# Patient Record
Sex: MALE | Race: WHITE | NOT HISPANIC OR LATINO | Employment: FULL TIME | URBAN - METROPOLITAN AREA
[De-identification: names, ages, dates, MRNs, and addresses within clinical notes are randomized per-mention and may not be internally consistent; named-entity substitution may affect disease eponyms.]

---

## 2020-11-02 ENCOUNTER — HOSPITAL ENCOUNTER (INPATIENT)
Facility: HOSPITAL | Age: 30
LOS: 1 days | Discharge: HOME | DRG: 177 | End: 2020-11-03
Attending: EMERGENCY MEDICINE | Admitting: FAMILY MEDICINE
Payer: COMMERCIAL

## 2020-11-02 DIAGNOSIS — R55 SYNCOPE: ICD-10-CM

## 2020-11-02 DIAGNOSIS — U07.1 COVID-19: Primary | ICD-10-CM

## 2020-11-02 DIAGNOSIS — R05.9 COUGH: ICD-10-CM

## 2020-11-02 LAB
ALBUMIN SERPL BCP-MCNC: 3.9 G/DL (ref 3.5–5.2)
ALP SERPL-CCNC: 87 U/L (ref 55–135)
ALT SERPL W/O P-5'-P-CCNC: 25 U/L (ref 10–44)
ANION GAP SERPL CALC-SCNC: 9 MMOL/L (ref 8–16)
AST SERPL-CCNC: 28 U/L (ref 10–40)
BASOPHILS # BLD AUTO: 0.01 K/UL (ref 0–0.2)
BASOPHILS NFR BLD: 0.2 % (ref 0–1.9)
BILIRUB SERPL-MCNC: 0.4 MG/DL (ref 0.1–1)
BUN SERPL-MCNC: 9 MG/DL (ref 6–20)
CALCIUM SERPL-MCNC: 8 MG/DL (ref 8.7–10.5)
CHLORIDE SERPL-SCNC: 103 MMOL/L (ref 95–110)
CK SERPL-CCNC: 226 U/L (ref 20–200)
CO2 SERPL-SCNC: 25 MMOL/L (ref 23–29)
CREAT SERPL-MCNC: 0.9 MG/DL (ref 0.5–1.4)
CRP SERPL-MCNC: 9.3 MG/L (ref 0–8.2)
CTP QC/QA: YES
DIFFERENTIAL METHOD: ABNORMAL
EOSINOPHIL # BLD AUTO: 0 K/UL (ref 0–0.5)
EOSINOPHIL NFR BLD: 0.4 % (ref 0–8)
ERYTHROCYTE [DISTWIDTH] IN BLOOD BY AUTOMATED COUNT: 13.2 % (ref 11.5–14.5)
EST. GFR  (AFRICAN AMERICAN): >60 ML/MIN/1.73 M^2
EST. GFR  (NON AFRICAN AMERICAN): >60 ML/MIN/1.73 M^2
FERRITIN SERPL-MCNC: 262 NG/ML (ref 20–300)
GLUCOSE SERPL-MCNC: 99 MG/DL (ref 70–110)
HCT VFR BLD AUTO: 40.3 % (ref 40–54)
HGB BLD-MCNC: 14.4 G/DL (ref 14–18)
IMM GRANULOCYTES # BLD AUTO: 0.01 K/UL (ref 0–0.04)
IMM GRANULOCYTES NFR BLD AUTO: 0.2 % (ref 0–0.5)
LACTATE SERPL-SCNC: 0.8 MMOL/L (ref 0.5–2.2)
LDH SERPL L TO P-CCNC: 216 U/L (ref 110–260)
LYMPHOCYTES # BLD AUTO: 1.1 K/UL (ref 1–4.8)
LYMPHOCYTES NFR BLD: 21.1 % (ref 18–48)
MCH RBC QN AUTO: 30.7 PG (ref 27–31)
MCHC RBC AUTO-ENTMCNC: 35.7 G/DL (ref 32–36)
MCV RBC AUTO: 86 FL (ref 82–98)
MONOCYTES # BLD AUTO: 0.6 K/UL (ref 0.3–1)
MONOCYTES NFR BLD: 12 % (ref 4–15)
NEUTROPHILS # BLD AUTO: 3.4 K/UL (ref 1.8–7.7)
NEUTROPHILS NFR BLD: 66.1 % (ref 38–73)
NRBC BLD-RTO: 0 /100 WBC
PLATELET # BLD AUTO: 125 K/UL (ref 150–350)
PMV BLD AUTO: 12.4 FL (ref 9.2–12.9)
POTASSIUM SERPL-SCNC: 3.8 MMOL/L (ref 3.5–5.1)
PROT SERPL-MCNC: 7 G/DL (ref 6–8.4)
RBC # BLD AUTO: 4.69 M/UL (ref 4.6–6.2)
SARS-COV-2 RDRP RESP QL NAA+PROBE: POSITIVE
SODIUM SERPL-SCNC: 137 MMOL/L (ref 136–145)
TROPONIN I SERPL DL<=0.01 NG/ML-MCNC: <0.006 NG/ML (ref 0–0.03)
WBC # BLD AUTO: 5.17 K/UL (ref 3.9–12.7)

## 2020-11-02 PROCEDURE — 94761 N-INVAS EAR/PLS OXIMETRY MLT: CPT

## 2020-11-02 PROCEDURE — 96367 TX/PROPH/DG ADDL SEQ IV INF: CPT

## 2020-11-02 PROCEDURE — U0002 COVID-19 LAB TEST NON-CDC: HCPCS | Performed by: EMERGENCY MEDICINE

## 2020-11-02 PROCEDURE — 25000242 PHARM REV CODE 250 ALT 637 W/ HCPCS: Performed by: FAMILY MEDICINE

## 2020-11-02 PROCEDURE — 93010 EKG 12-LEAD: ICD-10-PCS | Mod: ,,, | Performed by: INTERNAL MEDICINE

## 2020-11-02 PROCEDURE — 85025 COMPLETE CBC W/AUTO DIFF WBC: CPT

## 2020-11-02 PROCEDURE — 82728 ASSAY OF FERRITIN: CPT

## 2020-11-02 PROCEDURE — 63600175 PHARM REV CODE 636 W HCPCS: Performed by: EMERGENCY MEDICINE

## 2020-11-02 PROCEDURE — 93005 ELECTROCARDIOGRAM TRACING: CPT

## 2020-11-02 PROCEDURE — 96365 THER/PROPH/DIAG IV INF INIT: CPT

## 2020-11-02 PROCEDURE — 86140 C-REACTIVE PROTEIN: CPT

## 2020-11-02 PROCEDURE — 99900035 HC TECH TIME PER 15 MIN (STAT)

## 2020-11-02 PROCEDURE — 99285 EMERGENCY DEPT VISIT HI MDM: CPT | Mod: 25

## 2020-11-02 PROCEDURE — 63600175 PHARM REV CODE 636 W HCPCS: Performed by: FAMILY MEDICINE

## 2020-11-02 PROCEDURE — 94664 DEMO&/EVAL PT USE INHALER: CPT

## 2020-11-02 PROCEDURE — 82550 ASSAY OF CK (CPK): CPT

## 2020-11-02 PROCEDURE — 87040 BLOOD CULTURE FOR BACTERIA: CPT

## 2020-11-02 PROCEDURE — 84484 ASSAY OF TROPONIN QUANT: CPT

## 2020-11-02 PROCEDURE — 93010 ELECTROCARDIOGRAM REPORT: CPT | Mod: ,,, | Performed by: INTERNAL MEDICINE

## 2020-11-02 PROCEDURE — 80053 COMPREHEN METABOLIC PANEL: CPT

## 2020-11-02 PROCEDURE — 83605 ASSAY OF LACTIC ACID: CPT

## 2020-11-02 PROCEDURE — 27000646 HC AEROBIKA DEVICE

## 2020-11-02 PROCEDURE — 94640 AIRWAY INHALATION TREATMENT: CPT

## 2020-11-02 PROCEDURE — 25000003 PHARM REV CODE 250: Performed by: FAMILY MEDICINE

## 2020-11-02 PROCEDURE — 83615 LACTATE (LD) (LDH) ENZYME: CPT

## 2020-11-02 PROCEDURE — 96375 TX/PRO/DX INJ NEW DRUG ADDON: CPT

## 2020-11-02 PROCEDURE — 20600001 HC STEP DOWN PRIVATE ROOM

## 2020-11-02 RX ORDER — IBUPROFEN 200 MG
24 TABLET ORAL
Status: DISCONTINUED | OUTPATIENT
Start: 2020-11-02 | End: 2020-11-03 | Stop reason: HOSPADM

## 2020-11-02 RX ORDER — ACETAMINOPHEN 500 MG
1000 TABLET ORAL EVERY 8 HOURS PRN
Status: DISCONTINUED | OUTPATIENT
Start: 2020-11-02 | End: 2020-11-03 | Stop reason: HOSPADM

## 2020-11-02 RX ORDER — TALC
6 POWDER (GRAM) TOPICAL NIGHTLY PRN
Status: DISCONTINUED | OUTPATIENT
Start: 2020-11-02 | End: 2020-11-03 | Stop reason: HOSPADM

## 2020-11-02 RX ORDER — GLUCAGON 1 MG
1 KIT INJECTION
Status: DISCONTINUED | OUTPATIENT
Start: 2020-11-02 | End: 2020-11-03 | Stop reason: HOSPADM

## 2020-11-02 RX ORDER — DEXAMETHASONE SODIUM PHOSPHATE 4 MG/ML
6 INJECTION, SOLUTION INTRA-ARTICULAR; INTRALESIONAL; INTRAMUSCULAR; INTRAVENOUS; SOFT TISSUE
Status: COMPLETED | OUTPATIENT
Start: 2020-11-02 | End: 2020-11-02

## 2020-11-02 RX ORDER — IBUPROFEN 200 MG
16 TABLET ORAL
Status: DISCONTINUED | OUTPATIENT
Start: 2020-11-02 | End: 2020-11-03 | Stop reason: HOSPADM

## 2020-11-02 RX ORDER — INSULIN ASPART 100 [IU]/ML
0-5 INJECTION, SOLUTION INTRAVENOUS; SUBCUTANEOUS
Status: DISCONTINUED | OUTPATIENT
Start: 2020-11-02 | End: 2020-11-03 | Stop reason: HOSPADM

## 2020-11-02 RX ORDER — SODIUM CHLORIDE 0.9 % (FLUSH) 0.9 %
10 SYRINGE (ML) INJECTION
Status: DISCONTINUED | OUTPATIENT
Start: 2020-11-02 | End: 2020-11-03 | Stop reason: HOSPADM

## 2020-11-02 RX ORDER — ENOXAPARIN SODIUM 100 MG/ML
40 INJECTION SUBCUTANEOUS EVERY 24 HOURS
Status: DISCONTINUED | OUTPATIENT
Start: 2020-11-02 | End: 2020-11-03 | Stop reason: HOSPADM

## 2020-11-02 RX ORDER — ONDANSETRON 2 MG/ML
4 INJECTION INTRAMUSCULAR; INTRAVENOUS EVERY 8 HOURS PRN
Status: DISCONTINUED | OUTPATIENT
Start: 2020-11-02 | End: 2020-11-03 | Stop reason: HOSPADM

## 2020-11-02 RX ORDER — BENZONATATE 100 MG/1
100 CAPSULE ORAL 3 TIMES DAILY PRN
Status: DISCONTINUED | OUTPATIENT
Start: 2020-11-02 | End: 2020-11-03 | Stop reason: HOSPADM

## 2020-11-02 RX ORDER — AZITHROMYCIN 250 MG/1
500 TABLET, FILM COATED ORAL
Status: DISCONTINUED | OUTPATIENT
Start: 2020-11-02 | End: 2020-11-03 | Stop reason: HOSPADM

## 2020-11-02 RX ORDER — ALBUTEROL SULFATE 90 UG/1
2 AEROSOL, METERED RESPIRATORY (INHALATION) EVERY 6 HOURS
Status: DISCONTINUED | OUTPATIENT
Start: 2020-11-02 | End: 2020-11-03 | Stop reason: HOSPADM

## 2020-11-02 RX ORDER — CHOLECALCIFEROL (VITAMIN D3) 25 MCG
1000 TABLET ORAL DAILY
Status: DISCONTINUED | OUTPATIENT
Start: 2020-11-03 | End: 2020-11-03 | Stop reason: HOSPADM

## 2020-11-02 RX ORDER — ASCORBIC ACID 500 MG
500 TABLET ORAL 2 TIMES DAILY
Status: DISCONTINUED | OUTPATIENT
Start: 2020-11-02 | End: 2020-11-03 | Stop reason: HOSPADM

## 2020-11-02 RX ORDER — CEFTRIAXONE 1 G/1
1 INJECTION, POWDER, FOR SOLUTION INTRAMUSCULAR; INTRAVENOUS ONCE
Status: DISCONTINUED | OUTPATIENT
Start: 2020-11-02 | End: 2020-11-03 | Stop reason: HOSPADM

## 2020-11-02 RX ADMIN — ENOXAPARIN SODIUM 40 MG: 40 INJECTION SUBCUTANEOUS at 08:11

## 2020-11-02 RX ADMIN — DEXAMETHASONE SODIUM PHOSPHATE 6 MG: 4 INJECTION, SOLUTION INTRA-ARTICULAR; INTRALESIONAL; INTRAMUSCULAR; INTRAVENOUS; SOFT TISSUE at 12:11

## 2020-11-02 RX ADMIN — CEFTRIAXONE 2 G: 2 INJECTION, SOLUTION INTRAVENOUS at 12:11

## 2020-11-02 RX ADMIN — OXYCODONE HYDROCHLORIDE AND ACETAMINOPHEN 500 MG: 500 TABLET ORAL at 08:11

## 2020-11-02 RX ADMIN — AZITHROMYCIN 500 MG: 500 INJECTION, POWDER, LYOPHILIZED, FOR SOLUTION INTRAVENOUS at 01:11

## 2020-11-02 RX ADMIN — ALBUTEROL SULFATE 2 PUFF: 90 AEROSOL, METERED RESPIRATORY (INHALATION) at 09:11

## 2020-11-02 NOTE — ED NOTES
Patient to ed with c/o generalized aches, fever and chills. Pt sent from Urgent care after pt had syncopal episode in urgent care. Pt reports with red and swollen lip after pt fell, striking his face. Reports LOC for unknown amount of time. Patient now AAOx3. Neuro intact. Follows commands. Speech is clear. Moves bilat upper and lower ext equally. Patient ambulates without difficulty. Cardiac mon in place. Vs stable. Will continue to monitor.

## 2020-11-02 NOTE — PLAN OF CARE
(Physician in Lead of Transfers)   Outside Transfer Acceptance Note / Regional Referral Center    Name: Bob Connell    Transferring Physician: Arnol Fortune MD///Emergency Medicine    Accepting Physician: STEVEN Mesa MD    Date of Acceptance:  November 2, 2020    Transferring Facility:  [facility] Emergency Department    Destination Facility and Admitting Physician:  [facility] medicine COVID med/tele    Reason for Transfer:  COVID pneumonia    Report from Transferring Physician/Hospital course: 30-year-old male with no significant past medical history presented to the [facility] emergency department with an episode of loss of consciousness while checking out from an urgent care center.  By report he had negative strep and influenza testing.  He was at the urgent care for evaluation of fever, body aches, congestion, and fatigue.  COVID in the emergency department was positive.  Chest x-ray had new diffuse bilateral airspace opacities.  With ambulation, oxygen saturations were 92%.  He had no focal neurologic deficits and was awake and alert with normal speech.  In the emergency department he received Rocephin, azithromycin, and dexamethasone.  The ER physician felt he was stable for transfer.    VS:  Temperature 98.4°, pulse 82, respirations 20, blood pressure 119/70, oxygen saturation 96% on 3 L nasal cannula    Labs: see epic, COVID positive    Radiographs: see epic    To Do List: Admit to  COVID med tele  COVID protocols    Upon patient arrival to floor, please send SecureChat to [facility] P or call extension [phone] (if no answer, this will flip to a beeper, so enter your call back number) for Hospital Medicine admit team assignment and for additional admit orders for the patient.  Do not page the attending physician associated with the patient on arrival (this physician may not be on duty at the time of arrival).  Rather, always call [phone] to reach the triage physician for orders and  team assignment.      STEVEN Mesa MD  [facility]  Cell: [phone]

## 2020-11-02 NOTE — ED NOTES
Ambulatory room 02 sat ranged from 92%-96%. Pt denied SOB while ambulating. Ambulated without difficulty. Appears to be in NAD. Patient back in room with cardiac mon in place. Waiting results. Wife at bedside. Will continue to monitor.

## 2020-11-02 NOTE — ED NOTES
Report given to MILA Drummond at [facility]. Waiting for patient transfer.

## 2020-11-02 NOTE — ED PROVIDER NOTES
Encounter Date: 11/2/2020    SCRIBE #1 NOTE: I, Leif Joaquin, am scribing for, and in the presence of,  Arnol Fortune MD. I have scribed the following portions of the note - Other sections scribed: HPI, ROS, PE.       History     Chief Complaint   Patient presents with    Loss of Consciousness     pt had syncopal episode at urgent care.  pt did hit head on counter.  pt being seen for flu like illness at urgent care.  pt found bradycardic @48bpm.  300ml ns nsr.       This 30 y.o M with no pertinent PMHx presents to the ED via EMS c/o LOC PTA. The pt reports a syncopal episode while checking out at urgent care. He reports hitting the right side of his head on the desk. Per EMS, he did have a negative strep test and influenza test. He has not yet received the results of his COVID test. He was seen at  c/o fever, generalized body aches, nasal congestion and fatigue. He has not eaten today. He consumed 2 bottles of water yesterday. EMS reports a CBG of 105 mg/dL and HR of 48 bpm. No recent travel. No known sick contacts. He denies numbness, weakness, dysuria, difficulty urinating, constipation, diarrhea, emesis, diarrhea, SOB, rash and any other associated symptoms. EMS administered 300ml NS PTA. He does not smoke cigarettes, consume EtOH or use illicit drugs.       The history is provided by the patient.     Review of patient's allergies indicates:  No Known Allergies  History reviewed. No pertinent past medical history.  Past Surgical History:   Procedure Laterality Date    SHOULDER SURGERY       Family History   Problem Relation Age of Onset    Diabetes Mother     Diabetes Father     Cancer Maternal Grandmother     Stroke Neg Hx     Heart disease Neg Hx     Hyperlipidemia Neg Hx     Hypertension Neg Hx      Social History     Tobacco Use    Smoking status: Never Smoker    Smokeless tobacco: Never Used   Substance Use Topics    Alcohol use: Not Currently    Drug use: No     Review of Systems    Constitutional: Positive for fatigue and fever. Negative for chills.   HENT: Positive for congestion. Negative for rhinorrhea and sore throat.    Eyes: Negative for redness.   Respiratory: Negative for cough and shortness of breath.    Cardiovascular: Negative for chest pain.   Gastrointestinal: Negative for abdominal pain, diarrhea, nausea and vomiting.   Genitourinary: Negative for difficulty urinating and dysuria.   Musculoskeletal: Positive for myalgias. Negative for back pain.   Skin: Negative for color change and rash.   Neurological: Positive for syncope. Negative for weakness.   Psychiatric/Behavioral: The patient is not nervous/anxious.        Physical Exam     Initial Vitals [11/02/20 1039]   BP Pulse Resp Temp SpO2   (!) 128/91 80 20 99.8 °F (37.7 °C) 96 %      MAP       --         Physical Exam    Nursing note and vitals reviewed.  Constitutional: He appears well-developed and well-nourished.   HENT:   Head: Normocephalic. Head is without raccoon's eyes and without Chen's sign.   Right Ear: No hemotympanum.   Left Ear: No hemotympanum.   Nose: No nasal septal hematoma.   Mouth/Throat: Mucous membranes are normal.   Patent. Tongue is dry.   Eyes: Conjunctivae and EOM are normal. Pupils are equal, round, and reactive to light. Right conjunctiva is not injected. Left conjunctiva is not injected.   sclera anicteric   Neck: Full passive range of motion without pain. Neck supple.   Cardiovascular: Regular rhythm, S1 normal, S2 normal and normal heart sounds. Exam reveals no gallop and no friction rub.    No murmur heard.  Pulses:       Radial pulses are 2+ on the right side and 2+ on the left side.   Pulmonary/Chest: Effort normal and breath sounds normal. No respiratory distress.   Abdominal: Soft. Normal appearance. He exhibits no distension. There is no abdominal tenderness.   Musculoskeletal:      Comments: good active ROM of all extremities, no lower extremity edema or cyanosis   Lymphadenopathy:         Head (right side): Tonsillar adenopathy present.        Head (left side): Tonsillar adenopathy present.   Neurological: He is alert. No cranial nerve deficit or sensory deficit. Gait normal.   A&Ox4, normal speech   Skin: Skin is warm. Capillary refill takes 2 to 3 seconds. No ecchymosis and no rash noted.   Psychiatric: He has a normal mood and affect. Thought content normal.         ED Course   Procedures  Labs Reviewed   CBC W/ AUTO DIFFERENTIAL - Abnormal; Notable for the following components:       Result Value    Platelets 125 (*)     All other components within normal limits   COMPREHENSIVE METABOLIC PANEL - Abnormal; Notable for the following components:    Calcium 8.0 (*)     All other components within normal limits   C-REACTIVE PROTEIN - Abnormal; Notable for the following components:    CRP 9.3 (*)     All other components within normal limits   CK - Abnormal; Notable for the following components:     (*)     All other components within normal limits   SARS-COV-2 RDRP GENE - Abnormal; Notable for the following components:    POC Rapid COVID Positive (*)     All other components within normal limits   CULTURE, BLOOD   CULTURE, BLOOD   FERRITIN   LACTATE DEHYDROGENASE   LACTIC ACID, PLASMA   TROPONIN I        ECG Results          EKG 12-lead (In process)  Result time 11/02/20 12:46:32    In process by Interface, [facility] (11/02/20 12:46:32)                 Narrative:    Test Reason : R55,    Vent. Rate : 082 BPM     Atrial Rate : 082 BPM     P-R Int : 138 ms          QRS Dur : 086 ms      QT Int : 350 ms       P-R-T Axes : 051 072 006 degrees     QTc Int : 408 ms    Normal sinus rhythm  Normal ECG  No previous ECGs available    Referred By: [email or web address]   SELF           Confirmed By:                   In process by Interface, [facility] (11/02/20 12:37:12)                 Narrative:    Test Reason : R55,    Vent. Rate : 082 BPM     Atrial Rate : 082 BPM     P-R Int : 138 ms          QRS Dur  : 086 ms      QT Int : 350 ms       P-R-T Axes : 051 072 006 degrees     QTc Int : 408 ms    Normal sinus rhythm  Normal ECG  No previous ECGs available    Referred By: AAAREFERR   SELF           Confirmed By:                             Imaging Results          X-Ray Chest 1 View (Final result)  Result time 11/02/20 11:10:55   Procedure changed from X-Ray Chest PA And Lateral     Final result by Flako Tabares MD (11/02/20 11:10:55)                 Impression:      New diffuse bilateral ill-defined airspace opacities, possibly edema or infectious/inflammatory disease.  Recommend follow-up to resolution, as clinically indicated.      Electronically signed by: Flako Tabares MD  Date:    11/02/2020  Time:    11:10             Narrative:    EXAMINATION:  XR CHEST 1 VIEW    CLINICAL HISTORY:  cough; Cough    TECHNIQUE:  Single frontal view of the chest was performed.    COMPARISON:  December 10, 2018    FINDINGS:  Diffuse bilateral ill-defined airspace opacities, new since 2018.    No effusion or pneumothorax.    No acute bone abnormality.                                 Medical Decision Making:   Initial Assessment:   30-year-old male presenting secondary to upper respiratory like infections in a syncopal event today.  No major medical problems other than overweight.  Patient was found to be COVID positive.  Ambulatory O2 sat dropped to 92%.  Fundi bilateral opacities.  Patient was given a L of fluids here in the emergency department also started on ceftriaxone azithromycin along with 6 mg Decadron.  Due to dropping below 94% following [facility] recommendations will transfer patient over to [facility] further workup and management.  Rest of his labs were added on which did not show any marked abnormalities.  Patient is agreeable at this time to transfer.  I spoke with Dr. Mesa who accepted transfer.  Independently Interpreted Test(s):   I have ordered and independently interpreted EKG Reading(s) - see prior  notes  Clinical Tests:   Lab Tests: Ordered and Reviewed  Radiological Study: Ordered and Reviewed  Medical Tests: Ordered and Reviewed            Scribe Attestation:   Scribe #1: I performed the above scribed service and the documentation accurately describes the services I performed. I attest to the accuracy of the note.                      Clinical Impression:       ICD-10-CM ICD-9-CM   1. COVID-19  U07.1 079.89   2. Syncope  R55 780.2   3. Cough  R05 786.2                          ED Disposition Condition    Transfer to Another Facility Stable             I, arnol fortune, personally performed the services described in this documentation. All medical record entries made by the scribe were at my direction and in my presence. I have reviewed the chart and agree that the record reflects my personal performance and is accurate and complete.                 Arnol Fortune MD  11/02/20 5162

## 2020-11-02 NOTE — ED NOTES
Patient and wife informed of admission status. Patient's wife has further questions regarding admission and transfer of care. Family member requesting to speak to MD. MD informed. To see patient. Will continue to monitor.

## 2020-11-03 ENCOUNTER — NURSE TRIAGE (OUTPATIENT)
Dept: ADMINISTRATIVE | Facility: CLINIC | Age: 30
End: 2020-11-03

## 2020-11-03 VITALS
TEMPERATURE: 98 F | BODY MASS INDEX: 38.66 KG/M2 | DIASTOLIC BLOOD PRESSURE: 88 MMHG | HEIGHT: 70 IN | SYSTOLIC BLOOD PRESSURE: 158 MMHG | HEART RATE: 87 BPM | WEIGHT: 270.06 LBS | RESPIRATION RATE: 20 BRPM | OXYGEN SATURATION: 97 %

## 2020-11-03 PROBLEM — R55 SYNCOPE: Status: ACTIVE | Noted: 2020-11-03

## 2020-11-03 LAB
ALBUMIN SERPL BCP-MCNC: 3.7 G/DL (ref 3.5–5.2)
ALP SERPL-CCNC: 84 U/L (ref 55–135)
ALT SERPL W/O P-5'-P-CCNC: 36 U/L (ref 10–44)
ANION GAP SERPL CALC-SCNC: 12 MMOL/L (ref 8–16)
ANISOCYTOSIS BLD QL SMEAR: SLIGHT
AST SERPL-CCNC: 31 U/L (ref 10–40)
BASOPHILS # BLD AUTO: 0 K/UL (ref 0–0.2)
BASOPHILS NFR BLD: 0 % (ref 0–1.9)
BILIRUB SERPL-MCNC: 0.4 MG/DL (ref 0.1–1)
BNP SERPL-MCNC: <10 PG/ML (ref 0–99)
BUN SERPL-MCNC: 12 MG/DL (ref 6–20)
CALCIUM SERPL-MCNC: 9 MG/DL (ref 8.7–10.5)
CHLORIDE SERPL-SCNC: 105 MMOL/L (ref 95–110)
CO2 SERPL-SCNC: 22 MMOL/L (ref 23–29)
CREAT SERPL-MCNC: 0.8 MG/DL (ref 0.5–1.4)
D DIMER PPP IA.FEU-MCNC: 0.34 MG/L FEU
DIFFERENTIAL METHOD: ABNORMAL
EOSINOPHIL # BLD AUTO: 0 K/UL (ref 0–0.5)
EOSINOPHIL NFR BLD: 0 % (ref 0–8)
ERYTHROCYTE [DISTWIDTH] IN BLOOD BY AUTOMATED COUNT: 12.8 % (ref 11.5–14.5)
EST. GFR  (AFRICAN AMERICAN): >60 ML/MIN/1.73 M^2
EST. GFR  (NON AFRICAN AMERICAN): >60 ML/MIN/1.73 M^2
GLUCOSE SERPL-MCNC: 138 MG/DL (ref 70–110)
HCT VFR BLD AUTO: 43.3 % (ref 40–54)
HGB BLD-MCNC: 14.2 G/DL (ref 14–18)
IMM GRANULOCYTES # BLD AUTO: 0.01 K/UL (ref 0–0.04)
IMM GRANULOCYTES NFR BLD AUTO: 0.2 % (ref 0–0.5)
LYMPHOCYTES # BLD AUTO: 0.8 K/UL (ref 1–4.8)
LYMPHOCYTES NFR BLD: 19.2 % (ref 18–48)
MAGNESIUM SERPL-MCNC: 1.9 MG/DL (ref 1.6–2.6)
MCH RBC QN AUTO: 29.3 PG (ref 27–31)
MCHC RBC AUTO-ENTMCNC: 32.8 G/DL (ref 32–36)
MCV RBC AUTO: 90 FL (ref 82–98)
MONOCYTES # BLD AUTO: 0.5 K/UL (ref 0.3–1)
MONOCYTES NFR BLD: 11.1 % (ref 4–15)
NEUTROPHILS # BLD AUTO: 2.9 K/UL (ref 1.8–7.7)
NEUTROPHILS NFR BLD: 69.5 % (ref 38–73)
NRBC BLD-RTO: 0 /100 WBC
PHOSPHATE SERPL-MCNC: 3.4 MG/DL (ref 2.7–4.5)
PLATELET # BLD AUTO: 140 K/UL (ref 150–350)
PLATELET BLD QL SMEAR: ABNORMAL
PMV BLD AUTO: 12.8 FL (ref 9.2–12.9)
POLYCHROMASIA BLD QL SMEAR: ABNORMAL
POTASSIUM SERPL-SCNC: 4.2 MMOL/L (ref 3.5–5.1)
PROCALCITONIN SERPL IA-MCNC: 0.02 NG/ML
PROT SERPL-MCNC: 7.2 G/DL (ref 6–8.4)
RBC # BLD AUTO: 4.84 M/UL (ref 4.6–6.2)
SODIUM SERPL-SCNC: 139 MMOL/L (ref 136–145)
WBC # BLD AUTO: 4.16 K/UL (ref 3.9–12.7)

## 2020-11-03 PROCEDURE — 84100 ASSAY OF PHOSPHORUS: CPT

## 2020-11-03 PROCEDURE — 84145 PROCALCITONIN (PCT): CPT

## 2020-11-03 PROCEDURE — 83880 ASSAY OF NATRIURETIC PEPTIDE: CPT

## 2020-11-03 PROCEDURE — 63600175 PHARM REV CODE 636 W HCPCS: Performed by: FAMILY MEDICINE

## 2020-11-03 PROCEDURE — 83735 ASSAY OF MAGNESIUM: CPT

## 2020-11-03 PROCEDURE — 99900035 HC TECH TIME PER 15 MIN (STAT)

## 2020-11-03 PROCEDURE — 94640 AIRWAY INHALATION TREATMENT: CPT

## 2020-11-03 PROCEDURE — 25000003 PHARM REV CODE 250: Performed by: FAMILY MEDICINE

## 2020-11-03 PROCEDURE — 25000242 PHARM REV CODE 250 ALT 637 W/ HCPCS: Performed by: FAMILY MEDICINE

## 2020-11-03 PROCEDURE — 99223 1ST HOSP IP/OBS HIGH 75: CPT | Mod: ,,, | Performed by: FAMILY MEDICINE

## 2020-11-03 PROCEDURE — 94761 N-INVAS EAR/PLS OXIMETRY MLT: CPT

## 2020-11-03 PROCEDURE — 80053 COMPREHEN METABOLIC PANEL: CPT

## 2020-11-03 PROCEDURE — 85379 FIBRIN DEGRADATION QUANT: CPT

## 2020-11-03 PROCEDURE — 36415 COLL VENOUS BLD VENIPUNCTURE: CPT

## 2020-11-03 PROCEDURE — 94664 DEMO&/EVAL PT USE INHALER: CPT

## 2020-11-03 PROCEDURE — 99223 PR INITIAL HOSPITAL CARE,LEVL III: ICD-10-PCS | Mod: ,,, | Performed by: FAMILY MEDICINE

## 2020-11-03 PROCEDURE — 85025 COMPLETE CBC W/AUTO DIFF WBC: CPT

## 2020-11-03 RX ORDER — ACETAMINOPHEN 500 MG
500 TABLET ORAL EVERY 6 HOURS PRN
Refills: 0
Start: 2020-11-03

## 2020-11-03 RX ORDER — CHOLECALCIFEROL (VITAMIN D3) 25 MCG
1000 TABLET ORAL DAILY
Start: 2020-11-04

## 2020-11-03 RX ORDER — ASCORBIC ACID 500 MG
500 TABLET ORAL 2 TIMES DAILY
COMMUNITY
Start: 2020-11-03

## 2020-11-03 RX ORDER — ALBUTEROL SULFATE 90 UG/1
2 AEROSOL, METERED RESPIRATORY (INHALATION) EVERY 6 HOURS PRN
Qty: 18 G | Refills: 0 | Status: SHIPPED | OUTPATIENT
Start: 2020-11-03 | End: 2021-11-03

## 2020-11-03 RX ORDER — AZITHROMYCIN 250 MG/1
500 TABLET, FILM COATED ORAL DAILY
Qty: 10 TABLET | Refills: 0 | Status: ON HOLD | OUTPATIENT
Start: 2020-11-03 | End: 2020-11-10 | Stop reason: HOSPADM

## 2020-11-03 RX ORDER — HYDRALAZINE HYDROCHLORIDE 25 MG/1
25 TABLET, FILM COATED ORAL EVERY 8 HOURS PRN
Status: DISCONTINUED | OUTPATIENT
Start: 2020-11-03 | End: 2020-11-03 | Stop reason: HOSPADM

## 2020-11-03 RX ADMIN — ALBUTEROL SULFATE 2 PUFF: 90 AEROSOL, METERED RESPIRATORY (INHALATION) at 02:11

## 2020-11-03 RX ADMIN — THERA TABS 1 TABLET: TAB at 09:11

## 2020-11-03 RX ADMIN — Medication 1000 UNITS: at 09:11

## 2020-11-03 RX ADMIN — DEXAMETHASONE 6 MG: 4 TABLET ORAL at 09:11

## 2020-11-03 RX ADMIN — ALBUTEROL SULFATE 2 PUFF: 90 AEROSOL, METERED RESPIRATORY (INHALATION) at 08:11

## 2020-11-03 RX ADMIN — OXYCODONE HYDROCHLORIDE AND ACETAMINOPHEN 500 MG: 500 TABLET ORAL at 09:11

## 2020-11-03 NOTE — PLAN OF CARE
Stable for discharge - home with no needs. Will continue abx and steroid treatment at home while remaining quarantined. Will continue to follow    Emeli Medina RN  Case Management  Ext [phone]       11/03/20 1248   Post-Acute Status   Post-Acute Authorization Other   Other Status No Post-Acute Service Needs   Discharge Delays None known at this time   Discharge Plan   Discharge Plan A Home   Discharge Plan B Home with family

## 2020-11-03 NOTE — PLAN OF CARE
Problem: Adult Inpatient Plan of Care  Goal: Plan of Care Review  Outcome: Ongoing, Progressing  Flowsheets (Taken 11/3/2020 3053)  Plan of Care Reviewed With: patient

## 2020-11-03 NOTE — DISCHARGE SUMMARY
"[facility]  Discharge Summary  [facility] Medicine    Patient Name: Robbie Connell  YOB: 1990    Admit Date: 11/2/2020    Discharge Date and Time: 11/3/2020  1:01 PM    Discharge Attending Physician: Gerson Huston MD     Team: [facility] MED R    Reason for Admission:   Chief Complaint   Patient presents with    Loss of Consciousness     pt had syncopal episode at urgent care.  pt did hit head on counter.  pt being seen for flu like illness at urgent care.  pt found bradycardic @48bpm.  300ml ns nsr.         Active Hospital Problems    Diagnosis  POA    *COVID-19 [U07.1]  Yes    Syncope [R55]  Unknown      Resolved Hospital Problems   No resolved problems to display.       HPI:   30-year-old male with no significant past medical history presented to the [facility] emergency department with an episode of loss of consciousness while checking out from an urgent care center.  By report he had negative strep and influenza testing.  He was at the urgent care for evaluation of fever, body aches, congestion, and fatigue.  COVID in the emergency department was positive.  Chest x-ray had new diffuse bilateral airspace opacities.  With ambulation, oxygen saturations were 92%.  He had no focal neurologic deficits and was awake and alert with normal speech.  In the emergency department he received Rocephin, azithromycin, and dexamethasone.  The ER physician felt he was stable for transfer."     Patient seen and examined on arrival to our facility. Patient presently afebrile and hemodynamically stable saturating in mid 90's on room air. He has been admitted to the care of medicine for further evaluation and management.    Hospital Course:   He was started on supportive tx for covid with abx, inhalers, steroids. He was stable, and saturating well on RA. This  Morning patient requested discharge and he feels much better overall. His labs are unremarkable, vitals stable, orthostatic vitals neg, and " "ambulatory oxygen high 90s. Suspecting syncope in this young male secondary to vasovagal, and component of volume depletion and decreased po intake for several days. EKG unremarkable, and he has no hx of heart disease. He is stable for discharge with COVID surveillance  Program. He need PCP, ambulatory referral placed. He was discharged on abx for total 5 days.    Principal Problem: COVID-19    Other Problems Addressed:  COVID-19 Virus Infection  Multifocal Pneumonia  Syncopal Episode  Obesity    Procedures Performed: * No surgery found *    Special Care, Treatment, and Services Provided: none    Consults: none    Significant Diagnostic Studies: All reviewed     Final Diagnoses: Same as principal problem.    Discharged Condition: stable  Face to face services were provided on 11/3/2020   Time Spent:  I spent > 30 minutes on the discharge, which included reviewing hospital course with patient/family, reviewing discharge medications, and arranging follow-up care.  Physical Exam on 11/3/2020:  BP (!) 158/88 (BP Location: Left arm, Patient Position: Standing)   Pulse 87   Temp 98.1 °F (36.7 °C) (Oral)   Resp 20   Ht 5' 10" (1.778 m)   Wt 122.5 kg (270 lb 1 oz)   SpO2 97%   BMI 38.75 kg/m²     Constitutional: Appears NAD, non-toxic  Head: Normocephalic and atraumatic.   Eyes: EOM are normal. Pupils are equal, round, and reactive to light. No scleral icterus.   Neck: Normal range of motion. Neck supple.   Cardiovascular: Normal heart rate.  Regular heart rhythm.  No murmur heard.  Pulmonary/Chest: Comfortable on room air.  CTAB  Abdominal: Soft. Bowel sounds are normal.  No distension.  No tenderness  Musculoskeletal: Normal range of motion. No edema.   Neurological: Alert and oriented to person, place, and time.   Skin: Skin is warm and dry.   Psychiatric: Normal mood and affect. Behavior is normal.     Disposition: Home or Self Care    Follow Up Instructions:   Follow-up Information     Hector Frazier MD On " 11/16/2020.    Specialty: Internal Medicine  Why: hospital follow-up @ 10:00am  Contact information:  [address] [place] [id number] [phone]                 Future Appointments   Date Time Provider Department Center   11/16/2020 10:00 AM Hector Frazier MD [facility]       Medications:    Discharge Medication List as of 11/3/2020 11:52 AM      START taking these medications    Details   acetaminophen (TYLENOL) 500 MG tablet Take 1 tablet (500 mg total) by mouth every 6 (six) hours as needed (pain or temp > 101). OTC, Starting Tue 11/3/2020, No Print      albuterol (PROVENTIL/VENTOLIN HFA) 90 mcg/actuation inhaler Inhale 2 puffs into the lungs every 6 (six) hours as needed for Wheezing or Shortness of Breath. Rescue, Starting Tue 11/3/2020, Until Wed 11/3/2021, Normal      ascorbic acid, vitamin C, (VITAMIN C) 500 MG tablet Take 1 tablet (500 mg total) by mouth 2 (two) times daily., Starting Tue 11/3/2020, OTC      azithromycin (Z-JUAN CARLOS) 250 MG tablet Take 2 tablets (500 mg total) by mouth once daily. for 5 days, Starting Tue 11/3/2020, Until Sun 11/8/2020, Normal      multivitamin Tab Take 1 tablet by mouth once daily. Over the counter, Starting Wed 11/4/2020, No Print      pulse oximeter (PULSE OXIMETER) device by Apply Externally route 2 (two) times a day. Use twice daily at 8 AM and 3 PM and record the value in MyChart as directed., Starting Tue 11/3/2020, Normal      vitamin D (VITAMIN D3) 1000 units Tab Take 1 tablet (1,000 Units total) by mouth once daily. Over the counter, Starting Wed 11/4/2020, No Print         STOP taking these medications       ibuprofen (ADVIL,MOTRIN) 600 MG tablet Comments:   Reason for Stopping:               Discharge Instructions:  Discussed in length with patient. If his presenting symptoms were to worsen, or if febrile, he should return to the ED. Patient voiced understanding. He needs a PCP, referral placed for PCP.      Discharge Procedure Orders   Ambulatory  referral/consult to Internal Medicine   Standing Status: Future   Referral Priority: Routine Referral Type: Consultation   Referral Reason: Specialty Services Required   Requested Specialty: Internal Medicine   Number of Visits Requested: 1     COVID-19 Surveillance Program     Order Specific Question Answer Comments   Does patient have a smartphone? Yes    Does patient have the [employer] mitzi on their smartphone? Yes    While in surveillance program, will patient be using home oxygen? No          Gerson Huston MD  Department of Hospital Medicine

## 2020-11-03 NOTE — PLAN OF CARE
CM called and spoke with Bharati  (spouse) to patient in [id number] for Discharge Planning Assessment. Per Bharati she lives with her spouse Robbie in a single family home on a slab foundation with 3 steps to porch and point of entry.  Patient was independent with ADLS and DID NOT use in DME or in-home assistive equipment.  He is not on dialysis or coumadin. He take medications as prescribed / keeps refilled / has resources for all daily and prescriptive needs. Preferred pharmacy is [facility] [address] - Agreeable to bedside delivery. He  will have help from his spouse and other immediate family upon discharge?All questions addressed. Unit and CM direct numbers provided. Will continue to follow for course of hospitalization    11/2/2020 10:36 AM   Cough [R05]  Syncope [R55]  COVID-19 [U07.1]  COVID-19 [U07.1]          PCP:  Hector Frazier MD    Pharmacy:  [id number] - [place], [place] - [facility] [address] AT [address]  [address]  [facility] [address]  Phone: [phone] Fax: [phone]    [id number] [place] [address] [facility] [address] [facility] [address] [address]  Phone: [phone] Fax: [phone]    Emergency Contacts:  Extended Emergency Contact Information  Primary Emergency Contact: ShaziaBharati leyva  Address: [address] [place] [address]           [place] [address] [facility]  Home Phone: [phone]  Mobile Phone: [phone]  Relation: Spouse    Insurance:  Payor: BLUE CROSS BLUE SHIELD / Plan: BCBS ALL OUT OF STATE / Product Type: SHERMAN /       Emeli Medina RN  Case Management  Ext: [phone]       11/03/20 1231   Discharge Assessment   Assessment Type Discharge Planning Assessment   Confirmed/corrected address and phone number on facesheet? Yes   Assessment information obtained from? Other   Expected Length of Stay (days) 1   Communicated expected length of stay with patient/caregiver yes   Prior to hospitilization cognitive  status: Alert/Oriented   Prior to hospitalization functional status: Independent   Current cognitive status: Alert/Oriented;No Deficits   Current Functional Status: Independent   Facility Arrived From: [facility]   Lives With spouse   Able to Return to Prior Arrangements yes   Is patient able to care for self after discharge? Yes   Who are your caregiver(s) and their phone number(s)? Bharati (wife) [phone]   Patient's perception of discharge disposition home or selfcare   Readmission Within the Last 30 Days no previous admission in last 30 days   Patient currently being followed by outpatient case management? No   Patient currently receives any other outside agency services? No   Equipment Currently Used at Home none   Do you have any problems affording any of your prescribed medications? No   Is the patient taking medications as prescribed? yes   Does the patient have transportation home? Yes   Transportation Anticipated family or friend will provide   Dialysis Name and Scheduled days N/A   Does the patient receive services at the Coumadin Clinic? No   Discharge Plan A Home   Discharge Plan B Home with family   DME Needed Upon Discharge  none   Patient/Family in Agreement with Plan yes

## 2020-11-03 NOTE — H&P
"Hospital Medicine  History and Physical  [facility]      Patient Name: Robbie Connell  MRN:  [id number]  [facility] Medicine Team: [facility] HOSP MED R Ritesh Britt MD  Date of Admission:  11/2/2020     Length of Stay:  LOS: 1 day     Principal Problem: Acute Respiratory Disease 2/2 Covid-19 Virus Infection       History of Present Illness:    "30-year-old male with no significant past medical history presented [place] [facility] [place] with an episode of loss of consciousness while checking out from an urgent care center.  By report he had negative strep and influenza testing.  He was at the urgent care for evaluation of fever, body aches, congestion, and fatigue.  COVID in the emergency department was positive.  Chest x-ray had new diffuse bilateral airspace opacities.  With ambulation, oxygen saturations were 92%.  He had no focal neurologic deficits and was awake and alert with normal speech.  In the emergency department he received Rocephin, azithromycin, and dexamethasone.  The ER physician felt he was stable for transfer."    Patient seen and examined on arrival to our facility. Patient presently afebrile and hemodynamically stable saturating in mid 90's on room air. He has been admitted to the [facility] for further evaluation and management.    Review of Systems:  Constitutional: + chills, fever, fatigue, weakness  HENT: Negative for sore throat, trouble swallowing.    Eyes: Negative for photophobia, visual disturbance.   Respiratory: + cough, shortness of breath.    Cardiovascular: Negative for chest pain, palpitations, leg swelling.   Gastrointestinal: Negative for abdominal pain, nausea, vomiting, diarrhea, constipation  Genitourinary: Negative for dysuria, frequency.   Musculoskeletal: Negative for arthralgias, myalgias.   Skin: Negative for rash, wound, erythema   Neurological: Negative for numbness, paresthesias  Psychiatric/Behavioral: Negative for confusion, " hallucinations, anxiety  All other systems reviewed and are negative.      Past Medical History: Patient has no past medical history on file.      Past Surgical History: Patient has a past surgical history that includes Shoulder surgery.      Social History: Patient reports that he has never smoked. He has never used smokeless tobacco. He reports previous alcohol use. He reports that he does not use drugs.      Family History: Patient's family history includes Cancer in his maternal grandmother; Diabetes in his father and mother.      Medications: Scheduled Meds:   albuterol  2 puff Inhalation Q6H    ascorbic acid (vitamin C)  500 mg Oral BID    azithromycin  500 mg Oral Q24H    cefTRIAXone (ROCEPHIN) IVPB  1 g Intravenous Once    dexAMETHasone  6 mg Oral Daily    enoxaparin  40 mg Subcutaneous Q24H    multivitamin  1 tablet Oral Daily    vitamin D  1,000 Units Oral Daily     Continuous Infusions:  PRN Meds:.acetaminophen, benzonatate, dextrose 50%, dextrose 50%, glucagon (human recombinant), glucose, glucose, insulin aspart U-100, melatonin, ondansetron, sodium chloride 0.9%      Allergies: Patient has No Known Allergies.      Physical Exam:    Temp:  [98 °F (36.7 °C)-99.8 °F (37.7 °C)]   Pulse:  [64-87]   Resp:  [18-20]   BP: (119-176)/(60-91)   SpO2:  [91 %-98 %]     Constitutional: Appears NAD, non-toxic  Head: Normocephalic and atraumatic.   Eyes: EOM are normal. Pupils are equal, round, and reactive to light. No scleral icterus.   Neck: Normal range of motion. Neck supple.   Cardiovascular: Normal heart rate.  Regular heart rhythm.  No murmur heard.  Pulmonary/Chest: Comfortable on room air.  Coarse breath sounds throughout  Abdominal: Soft. Bowel sounds are normal.  No distension.  No tenderness  Musculoskeletal: Normal range of motion. No edema.   Neurological: Alert and oriented to person, place, and time.   Skin: Skin is warm and dry.   Psychiatric: Normal mood and affect. Behavior is normal.        Laboratory:  Recent Labs   Lab 11/02/20  1048   WBC 5.17   LYMPH 21.1  1.1   HGB 14.4   HCT 40.3   *     Recent Labs   Lab 11/02/20  1048      K 3.8      CO2 25   BUN 9   CREATININE 0.9   GLU 99   CALCIUM 8.0*     Recent Labs   Lab 11/02/20  1048   ALKPHOS 87   ALT 25   AST 28   ALBUMIN 3.9   PROT 7.0   BILITOT 0.4      Recent Labs     11/02/20  1215   FERRITIN 262   CRP 9.3*      TROPONINI <0.006   LACTATE 0.8       All labs within the last 24 hours were reviewed.     Microbiology:  Lab Results   Component Value Date    [id number] Positive (A) 11/02/2020       Microbiology Results (last 7 days)     Procedure Component Value Units Date/Time    Blood culture x two cultures. Draw prior to antibiotics. [[id number]] Collected: 11/02/20 1220    Order Status: Completed Specimen: Blood from Peripheral, Antecubital, Left Updated: 11/02/20 1912     Blood Culture, Routine No Growth to date    Narrative:      Aerobic and anaerobic    Blood culture x two cultures. Draw prior to antibiotics. [[id number]] Collected: 11/02/20 1210    Order Status: Completed Specimen: Blood from Peripheral, Antecubital, Right Updated: 11/02/20 1912     Blood Culture, Routine No Growth to date    Narrative:      Aerobic and anaerobic            Imaging  ECG Results          EKG 12-lead (Final result)  Result time 11/02/20 18:03:02    Final result by Interface, Lab In [facility] (11/02/20 18:03:02)                 Narrative:    Test Reason : R55,    Vent. Rate : 082 BPM     Atrial Rate : 082 BPM     P-R Int : 138 ms          QRS Dur : 086 ms      QT Int : 350 ms       P-R-T Axes : 051 072 006 degrees     QTc Int : 408 ms    Normal sinus rhythm  Normal ECG  No previous ECGs available  Confirmed by Andrés Armstrong MD ([id number]) on 11/2/2020 6:02:50 PM    Referred By: KANDIS   SELF           Confirmed By:Andrés Armstrong MD                              No results found for this or any previous visit.    X-Ray Chest 1  View  Narrative: EXAMINATION:  XR CHEST 1 VIEW    CLINICAL HISTORY:  cough; Cough    TECHNIQUE:  Single frontal view of the chest was performed.    COMPARISON:  December 10, 2018    FINDINGS:  Diffuse bilateral ill-defined airspace opacities, new since 2018.    No effusion or pneumothorax.    No acute bone abnormality.  Impression: New diffuse bilateral ill-defined airspace opacities, possibly edema or infectious/inflammatory disease.  Recommend follow-up to resolution, as clinically indicated.    Electronically signed by: Flako Tabares MD  Date:    11/02/2020  Time:    11:10      All imaging within the last 24 hours was reviewed.       Assessment and Plan:    Active Hospital Problems    Diagnosis  POA    *COVID-19 [U07.1]  Yes    Syncope [R55]  Unknown      Resolved Hospital Problems   No resolved problems to display.       COVID-19 Virus Infection  - COVID-19 testing: Positive on 11/02/20  - Isolation: Airborne/Droplet. Surgical mask on patient. Notify Infection Control  - Diagnostics: Trend Q48hrs if stable, more frequently if patient decompensating     Laboratory/Study Frequency Result   CBC Admit & Q48h    CMP Admit & Q48h    Magnesium level Admit    D-dimer Admit & Q48h    Ferritin Admit & Q48h    CRP Admit & Q48h    CPK Admit & Q24h if elevated    LDH Admit & Q48h    Vitamin D Admit    BNP Admit    Troponin Admit & Q6h if elevated    Glucose-6-phos dehydrogenase Admit    Procalcitonin Admit    Lipid Panel If starting statin    Rapid influenza Admit    Resp Infection Panel Admit if BMT/organ transplant    Legionella Antigen Admit    Sputum Culture Admit    Blood Culture Admit    Urinalysis & Culture Admit    ECG Admit & Q48h if on HCQ    CXR Admit    Lymphopenia, hyponatremia, hyperferritinemia, elevated troponin, elevated d-dimer, age, and medical comorbidities are significant predictors of poor clinical outcome    - [facility] ([facility])    - Monitoring:   - Telemetry  & Continuous Pulse Oximetry    - Nutrition:    - Multivitamin PO daily   - Add Boost supplement   - Vitamin D 1000IU daily if deficient   - Ascorbic acid 500mg PO bid    - Supportive Care:   - Acetaminophen 650mg PO Q6hr PRN fever/headache   - Loperamide PRN viral diarrhea   - IVF if indicated, restrictive strategy preferred, no maintenance IV if able   - VTE PPx: Enoxaparin or heparin SQ unless contraindicated    - Antibiotics:  - If indications, CXR findings, elevated procal. See protocol for alternatives.   - Discontinue early if low concern for bacterial co-infection   - Ceftriaxone 1g Q24h x 5 days  - Azithromycin 500mg po x1, then 250mg po daily x 4 days    - Investigational Therapies:   - If patient meets criteria  - Atorvastatin 40mg po daily  - Pharmacy consult for Remdisivir  - Dexamethasone 6mg PO/IV for 10 days if hypoxic.  Monitor for steroid induced hyperglycemia    Acute Hypoxemic Respiratory Failure 2/2 COVID19  Multifocal Pneumonia  - Order RT consult via Respiratory Communication for COVID Protocols  - Order Incentive Spirometer Q4h  - Order Flutter Valve Q4h  - Continuous Pulse Oximetry  - Goal SpO2 92-96%  - Supplemental O2 via LFNC, VentiMask, or HFNC (see Respiratory Support Oxygen Therapies)  - If wheezing, albuterol INH Q6h scheduled & PRN  - Proning Protocol if patient is a candidate (see  Proning Protocol)   - GCS >13, able to self-prone  - If deterioration, may warrant trial of NIPPV and transfer to neg pressure room or immediate ICU consult    Syncopal Episode  - suspect orthostatic episode in setting of dehydration vs hypoxemia in setting of COVID  - EKG nsr  - Orthostatics qshift  - further management as above    Patient's chronic/stable medical conditions noted in the assessment above will be managed with the patient's home medications as tolerated.      Diet:  Adult regular  VTE PPx:  Enoxaparin 40mg subq qHS (bundled care)  Goals of Care:  FULL CODE      Ritesh Britt,  [facility]

## 2020-11-03 NOTE — PLAN OF CARE
Problem: Adult Inpatient Plan of Care  Goal: Plan of Care Review  11/3/2020 0557 by Coni Zhong RN  Outcome: Ongoing, Progressing  11/3/2020 0555 by Coni Zhong RN  Outcome: Ongoing, Progressing  Flowsheets (Taken 11/3/2020 0555)  Plan of Care Reviewed With: patient  Goal: Patient-Specific Goal (Individualization)  Outcome: Ongoing, Progressing  Goal: Optimal Comfort and Wellbeing  Outcome: Ongoing, Progressing     Problem: Infection  Goal: Infection Symptom Resolution  Outcome: Ongoing, Progressing     Problem: Fall Injury Risk  Goal: Absence of Fall and Fall-Related Injury  Outcome: Ongoing, Progressing

## 2020-11-03 NOTE — NURSING
Pt received, oriented to room, unit, and call light.  No c/o pain or SOB, VSS, and on room air.  Cardiac monitor applied.  Will continue to monitor.

## 2020-11-03 NOTE — TELEPHONE ENCOUNTER
First attempt  Attempted to contact patient on behalf of [facility] Covid Surveillance Program Enrollment and Orientation Process.  No answer.  No contact   Left voice message.  My chart message sent

## 2020-11-03 NOTE — NURSING
Home Oxygen Evaluation    Date Performed: 11/3/2020    1) Patient's Home O2 Sat on room air, while at rest: 97%      If O2 sats on room air at rest are 88% or below, patient qualifies. No additional testing needed. Document N/A in steps 2 and 3. If 89% or above, complete steps 2.      2) Patient's O2 Sat on room air while exercisin%        If O2 sats on room air while exercising remain 89% or above patient does not qualify, no further testing needed Document N/A in step 3. If O2 sats on room air while exercising are 88% or below, continue to step 3.      3) Patient's O2 Sat while exercising on O2: n/a     (Must show improvement from #2 for patients to qualify)    If O2 sats improve on oxygen, patient qualifies for portable oxygen. If not, the patient does not qualify.

## 2020-11-04 ENCOUNTER — NURSE TRIAGE (OUTPATIENT)
Dept: ADMINISTRATIVE | Facility: CLINIC | Age: 30
End: 2020-11-04

## 2020-11-04 NOTE — TELEPHONE ENCOUNTER
2nd attempt to contact pt for enrollment in Covid Surveillance program. No answer x2. Left voicemail and sent [employer] message. Will attempt outreach again later.    Reason for Disposition   Message left on unidentified voice mail. Phone number verified.    Protocols used: NO CONTACT OR DUPLICATE CONTACT CALL-A-OH

## 2020-11-04 NOTE — TELEPHONE ENCOUNTER
3rd attempt to contact pt for enrollment in Covid Surveillance program. No answer x3. Left voicemail and sent [employer] message. Due to 3 unsuccessful attempts to reach pt for enrollment, per surveillance policy, will un-enroll pt from surveillance. DID NOT remove surveillance tasks so pt still eligible to participate by logging into [employer] and submitting consent. Will enroll pt in text home symptom monitoring program. No further attempts to contact pt for enrollment will be made.    Reason for Disposition   Message left on identified voice mail    Protocols used: NO CONTACT OR DUPLICATE CONTACT CALL-A-AH

## 2020-11-05 ENCOUNTER — HOSPITAL ENCOUNTER (INPATIENT)
Facility: HOSPITAL | Age: 30
LOS: 5 days | Discharge: HOME | DRG: 177 | End: 2020-11-10
Attending: EMERGENCY MEDICINE | Admitting: INTERNAL MEDICINE
Payer: COMMERCIAL

## 2020-11-05 ENCOUNTER — NURSE TRIAGE (OUTPATIENT)
Dept: ADMINISTRATIVE | Facility: CLINIC | Age: 30
End: 2020-11-05

## 2020-11-05 ENCOUNTER — PATIENT OUTREACH (OUTPATIENT)
Dept: ADMINISTRATIVE | Facility: CLINIC | Age: 30
End: 2020-11-05

## 2020-11-05 DIAGNOSIS — Z20.822 SUSPECTED COVID-19 VIRUS INFECTION: ICD-10-CM

## 2020-11-05 DIAGNOSIS — F32.A ANXIETY AND DEPRESSION: ICD-10-CM

## 2020-11-05 DIAGNOSIS — U07.1 COVID-19: Primary | ICD-10-CM

## 2020-11-05 DIAGNOSIS — F41.9 ANXIETY AND DEPRESSION: ICD-10-CM

## 2020-11-05 DIAGNOSIS — J96.01 ACUTE RESPIRATORY FAILURE WITH HYPOXIA: ICD-10-CM

## 2020-11-05 LAB
ALBUMIN SERPL BCP-MCNC: 3.8 G/DL (ref 3.5–5.2)
ALP SERPL-CCNC: 88 U/L (ref 55–135)
ALT SERPL W/O P-5'-P-CCNC: 142 U/L (ref 10–44)
ANION GAP SERPL CALC-SCNC: 11 MMOL/L (ref 8–16)
AST SERPL-CCNC: 64 U/L (ref 10–40)
BASOPHILS # BLD AUTO: 0.01 K/UL (ref 0–0.2)
BASOPHILS NFR BLD: 0.1 % (ref 0–1.9)
BILIRUB SERPL-MCNC: 0.6 MG/DL (ref 0.1–1)
BUN SERPL-MCNC: 9 MG/DL (ref 6–20)
CALCIUM SERPL-MCNC: 8.8 MG/DL (ref 8.7–10.5)
CHLORIDE SERPL-SCNC: 96 MMOL/L (ref 95–110)
CK SERPL-CCNC: 111 U/L (ref 20–200)
CO2 SERPL-SCNC: 24 MMOL/L (ref 23–29)
CREAT SERPL-MCNC: 0.8 MG/DL (ref 0.5–1.4)
CRP SERPL-MCNC: 120.4 MG/L (ref 0–8.2)
CTP QC/QA: YES
DIFFERENTIAL METHOD: ABNORMAL
EOSINOPHIL # BLD AUTO: 0 K/UL (ref 0–0.5)
EOSINOPHIL NFR BLD: 0 % (ref 0–8)
ERYTHROCYTE [DISTWIDTH] IN BLOOD BY AUTOMATED COUNT: 12.9 % (ref 11.5–14.5)
EST. GFR  (AFRICAN AMERICAN): >60 ML/MIN/1.73 M^2
EST. GFR  (NON AFRICAN AMERICAN): >60 ML/MIN/1.73 M^2
FERRITIN SERPL-MCNC: 527 NG/ML (ref 20–300)
GLUCOSE SERPL-MCNC: 103 MG/DL (ref 70–110)
HCT VFR BLD AUTO: 42.1 % (ref 40–54)
HGB BLD-MCNC: 14.1 G/DL (ref 14–18)
IMM GRANULOCYTES # BLD AUTO: 0.04 K/UL (ref 0–0.04)
IMM GRANULOCYTES NFR BLD AUTO: 0.6 % (ref 0–0.5)
LACTATE SERPL-SCNC: 1.2 MMOL/L (ref 0.5–2.2)
LDH SERPL L TO P-CCNC: 1378 U/L (ref 110–260)
LYMPHOCYTES # BLD AUTO: 0.9 K/UL (ref 1–4.8)
LYMPHOCYTES NFR BLD: 11.9 % (ref 18–48)
MCH RBC QN AUTO: 29.7 PG (ref 27–31)
MCHC RBC AUTO-ENTMCNC: 33.5 G/DL (ref 32–36)
MCV RBC AUTO: 89 FL (ref 82–98)
MONOCYTES # BLD AUTO: 0.6 K/UL (ref 0.3–1)
MONOCYTES NFR BLD: 7.7 % (ref 4–15)
NEUTROPHILS # BLD AUTO: 5.7 K/UL (ref 1.8–7.7)
NEUTROPHILS NFR BLD: 79.7 % (ref 38–73)
NRBC BLD-RTO: 0 /100 WBC
PLATELET # BLD AUTO: 136 K/UL (ref 150–350)
PMV BLD AUTO: 12.3 FL (ref 9.2–12.9)
POTASSIUM SERPL-SCNC: 3.9 MMOL/L (ref 3.5–5.1)
PROT SERPL-MCNC: 7.6 G/DL (ref 6–8.4)
RBC # BLD AUTO: 4.75 M/UL (ref 4.6–6.2)
SARS-COV-2 RDRP RESP QL NAA+PROBE: POSITIVE
SODIUM SERPL-SCNC: 131 MMOL/L (ref 136–145)
TROPONIN I SERPL DL<=0.01 NG/ML-MCNC: 0.01 NG/ML (ref 0–0.03)
WBC # BLD AUTO: 7.12 K/UL (ref 3.9–12.7)

## 2020-11-05 PROCEDURE — 99285 EMERGENCY DEPT VISIT HI MDM: CPT | Mod: 25

## 2020-11-05 PROCEDURE — 99285 EMERGENCY DEPT VISIT HI MDM: CPT | Mod: ,,, | Performed by: PHYSICIAN ASSISTANT

## 2020-11-05 PROCEDURE — 93010 ELECTROCARDIOGRAM REPORT: CPT | Mod: ,,, | Performed by: INTERNAL MEDICINE

## 2020-11-05 PROCEDURE — 63600175 PHARM REV CODE 636 W HCPCS: Performed by: PHYSICIAN ASSISTANT

## 2020-11-05 PROCEDURE — 12000002 HC ACUTE/MED SURGE SEMI-PRIVATE ROOM

## 2020-11-05 PROCEDURE — 93010 EKG 12-LEAD: ICD-10-PCS | Mod: ,,, | Performed by: INTERNAL MEDICINE

## 2020-11-05 PROCEDURE — 96374 THER/PROPH/DIAG INJ IV PUSH: CPT

## 2020-11-05 PROCEDURE — 80053 COMPREHEN METABOLIC PANEL: CPT

## 2020-11-05 PROCEDURE — 83615 LACTATE (LD) (LDH) ENZYME: CPT

## 2020-11-05 PROCEDURE — 84484 ASSAY OF TROPONIN QUANT: CPT

## 2020-11-05 PROCEDURE — U0002 COVID-19 LAB TEST NON-CDC: HCPCS | Performed by: EMERGENCY MEDICINE

## 2020-11-05 PROCEDURE — 85025 COMPLETE CBC W/AUTO DIFF WBC: CPT

## 2020-11-05 PROCEDURE — 25000003 PHARM REV CODE 250: Performed by: PHYSICIAN ASSISTANT

## 2020-11-05 PROCEDURE — 86140 C-REACTIVE PROTEIN: CPT

## 2020-11-05 PROCEDURE — 99285 PR EMERGENCY DEPT VISIT,LEVEL V: ICD-10-PCS | Mod: ,,, | Performed by: PHYSICIAN ASSISTANT

## 2020-11-05 PROCEDURE — 83605 ASSAY OF LACTIC ACID: CPT

## 2020-11-05 PROCEDURE — 82728 ASSAY OF FERRITIN: CPT

## 2020-11-05 PROCEDURE — 82550 ASSAY OF CK (CPK): CPT

## 2020-11-05 PROCEDURE — 93005 ELECTROCARDIOGRAM TRACING: CPT

## 2020-11-05 RX ORDER — DEXAMETHASONE SODIUM PHOSPHATE 4 MG/ML
6 INJECTION, SOLUTION INTRA-ARTICULAR; INTRALESIONAL; INTRAMUSCULAR; INTRAVENOUS; SOFT TISSUE
Status: COMPLETED | OUTPATIENT
Start: 2020-11-05 | End: 2020-11-05

## 2020-11-05 RX ORDER — ACETAMINOPHEN 500 MG
1000 TABLET ORAL
Status: COMPLETED | OUTPATIENT
Start: 2020-11-05 | End: 2020-11-05

## 2020-11-05 RX ADMIN — DEXAMETHASONE SODIUM PHOSPHATE 6 MG: 4 INJECTION INTRA-ARTICULAR; INTRALESIONAL; INTRAMUSCULAR; INTRAVENOUS; SOFT TISSUE at 11:11

## 2020-11-05 RX ADMIN — ACETAMINOPHEN 1000 MG: 500 TABLET ORAL at 11:11

## 2020-11-05 NOTE — PROGRESS NOTES
C3 nurse attempted to contact patient. No answer. The following message was left for the patient to return the call:  Good Morning I am a nurse calling on behalf of [facility] from the Care Coordination Center.  This is a Transitional Care Call for Robbie Connell  When you have a moment please contact us at [phone] or [phone] Monday through Friday, between the hours of 8 am to 4 pm. We look forward to speaking with you. On behalf of [facility] have a nice day.    The patient does not have a scheduled HOSFU appointment within 7-14 days post hospital discharge date 11/03/2020 Message sent to Physician staff to assist with HOSFU appointment scheduling.

## 2020-11-05 NOTE — PLAN OF CARE
Patient medically ready for discharge to HOME / NO NEEDS.  Any necessary transport setup by . This CM scheduled or requested necessary follow-up appointments. Family/patient aware of discharge. Wife provided transportation home    Future Appointments   Date Time Provider Department Center   11/16/2020 10:00 AM Hector Frazier MD [facility] Halina Medina RN  Case Management  Ext: [phone]  11/05/2020 11/05/20 1021   Final Note   Assessment Type Final Discharge Note   Anticipated Discharge Disposition Home   What phone number can be called within the next 1-3 days to see how you are doing after discharge? [phone]   Hospital Follow Up  Appt(s) scheduled? Yes  (Dr Frazier on 11/16/2020 @ 10am)   Discharge plans and expectations educations in teach back method with documentation complete? Yes  (Per bedside nurse)   Right Care Referral Info   Post Acute Recommendation No Care   Post-Acute Status   Other Status No Post-Acute Service Needs   Discharge Delays None known at this time

## 2020-11-06 PROBLEM — J96.01 ACUTE RESPIRATORY FAILURE WITH HYPOXIA: Status: ACTIVE | Noted: 2020-11-06

## 2020-11-06 LAB
ALBUMIN SERPL BCP-MCNC: 3.6 G/DL (ref 3.5–5.2)
ALP SERPL-CCNC: 85 U/L (ref 55–135)
ALT SERPL W/O P-5'-P-CCNC: 128 U/L (ref 10–44)
ANION GAP SERPL CALC-SCNC: 13 MMOL/L (ref 8–16)
AST SERPL-CCNC: 54 U/L (ref 10–40)
BASOPHILS # BLD AUTO: 0.01 K/UL (ref 0–0.2)
BASOPHILS NFR BLD: 0.1 % (ref 0–1.9)
BILIRUB SERPL-MCNC: 0.6 MG/DL (ref 0.1–1)
BUN SERPL-MCNC: 11 MG/DL (ref 6–20)
CALCIUM SERPL-MCNC: 9 MG/DL (ref 8.7–10.5)
CHLORIDE SERPL-SCNC: 100 MMOL/L (ref 95–110)
CO2 SERPL-SCNC: 23 MMOL/L (ref 23–29)
CREAT SERPL-MCNC: 0.8 MG/DL (ref 0.5–1.4)
DIFFERENTIAL METHOD: ABNORMAL
EOSINOPHIL # BLD AUTO: 0 K/UL (ref 0–0.5)
EOSINOPHIL NFR BLD: 0 % (ref 0–8)
ERYTHROCYTE [DISTWIDTH] IN BLOOD BY AUTOMATED COUNT: 12.9 % (ref 11.5–14.5)
EST. GFR  (AFRICAN AMERICAN): >60 ML/MIN/1.73 M^2
EST. GFR  (NON AFRICAN AMERICAN): >60 ML/MIN/1.73 M^2
GLUCOSE SERPL-MCNC: 157 MG/DL (ref 70–110)
HCT VFR BLD AUTO: 43.2 % (ref 40–54)
HGB BLD-MCNC: 14.3 G/DL (ref 14–18)
IMM GRANULOCYTES # BLD AUTO: 0.03 K/UL (ref 0–0.04)
IMM GRANULOCYTES NFR BLD AUTO: 0.4 % (ref 0–0.5)
LYMPHOCYTES # BLD AUTO: 0.6 K/UL (ref 1–4.8)
LYMPHOCYTES NFR BLD: 8.7 % (ref 18–48)
MAGNESIUM SERPL-MCNC: 2.1 MG/DL (ref 1.6–2.6)
MCH RBC QN AUTO: 29.5 PG (ref 27–31)
MCHC RBC AUTO-ENTMCNC: 33.1 G/DL (ref 32–36)
MCV RBC AUTO: 89 FL (ref 82–98)
MONOCYTES # BLD AUTO: 0.3 K/UL (ref 0.3–1)
MONOCYTES NFR BLD: 3.5 % (ref 4–15)
NEUTROPHILS # BLD AUTO: 6.2 K/UL (ref 1.8–7.7)
NEUTROPHILS NFR BLD: 87.3 % (ref 38–73)
NRBC BLD-RTO: 0 /100 WBC
PHOSPHATE SERPL-MCNC: 3.8 MG/DL (ref 2.7–4.5)
PLATELET # BLD AUTO: 130 K/UL (ref 150–350)
PMV BLD AUTO: 12 FL (ref 9.2–12.9)
POCT GLUCOSE: 107 MG/DL (ref 70–110)
POCT GLUCOSE: 120 MG/DL (ref 70–110)
POCT GLUCOSE: 131 MG/DL (ref 70–110)
POCT GLUCOSE: 139 MG/DL (ref 70–110)
POCT GLUCOSE: 168 MG/DL (ref 70–110)
POTASSIUM SERPL-SCNC: 4.1 MMOL/L (ref 3.5–5.1)
PROT SERPL-MCNC: 7.5 G/DL (ref 6–8.4)
RBC # BLD AUTO: 4.85 M/UL (ref 4.6–6.2)
SODIUM SERPL-SCNC: 136 MMOL/L (ref 136–145)
WBC # BLD AUTO: 7.13 K/UL (ref 3.9–12.7)

## 2020-11-06 PROCEDURE — 63700000 PHARM REV CODE 250 ALT 637 W/O HCPCS: Performed by: INTERNAL MEDICINE

## 2020-11-06 PROCEDURE — 94640 AIRWAY INHALATION TREATMENT: CPT

## 2020-11-06 PROCEDURE — 83735 ASSAY OF MAGNESIUM: CPT

## 2020-11-06 PROCEDURE — 63600175 PHARM REV CODE 636 W HCPCS: Performed by: HOSPITALIST

## 2020-11-06 PROCEDURE — 27000221 HC OXYGEN, UP TO 24 HOURS

## 2020-11-06 PROCEDURE — 84100 ASSAY OF PHOSPHORUS: CPT

## 2020-11-06 PROCEDURE — 27000646 HC AEROBIKA DEVICE

## 2020-11-06 PROCEDURE — 80053 COMPREHEN METABOLIC PANEL: CPT

## 2020-11-06 PROCEDURE — 63600175 PHARM REV CODE 636 W HCPCS: Performed by: INTERNAL MEDICINE

## 2020-11-06 PROCEDURE — 99223 1ST HOSP IP/OBS HIGH 75: CPT | Mod: ,,, | Performed by: INTERNAL MEDICINE

## 2020-11-06 PROCEDURE — 99900035 HC TECH TIME PER 15 MIN (STAT)

## 2020-11-06 PROCEDURE — 25000003 PHARM REV CODE 250: Performed by: HOSPITALIST

## 2020-11-06 PROCEDURE — 25000242 PHARM REV CODE 250 ALT 637 W/ HCPCS: Performed by: HOSPITALIST

## 2020-11-06 PROCEDURE — 94761 N-INVAS EAR/PLS OXIMETRY MLT: CPT

## 2020-11-06 PROCEDURE — 99223 PR INITIAL HOSPITAL CARE,LEVL III: ICD-10-PCS | Mod: ,,, | Performed by: INTERNAL MEDICINE

## 2020-11-06 PROCEDURE — 94799 UNLISTED PULMONARY SVC/PX: CPT

## 2020-11-06 PROCEDURE — 25000003 PHARM REV CODE 250: Performed by: INTERNAL MEDICINE

## 2020-11-06 PROCEDURE — 36415 COLL VENOUS BLD VENIPUNCTURE: CPT

## 2020-11-06 PROCEDURE — 94664 DEMO&/EVAL PT USE INHALER: CPT

## 2020-11-06 PROCEDURE — 20600001 HC STEP DOWN PRIVATE ROOM

## 2020-11-06 PROCEDURE — 85025 COMPLETE CBC W/AUTO DIFF WBC: CPT

## 2020-11-06 RX ORDER — INSULIN ASPART 100 [IU]/ML
0-5 INJECTION, SOLUTION INTRAVENOUS; SUBCUTANEOUS
Status: DISCONTINUED | OUTPATIENT
Start: 2020-11-06 | End: 2020-11-10 | Stop reason: HOSPADM

## 2020-11-06 RX ORDER — CEFTRIAXONE 1 G/1
1 INJECTION, POWDER, FOR SOLUTION INTRAMUSCULAR; INTRAVENOUS
Status: COMPLETED | OUTPATIENT
Start: 2020-11-06 | End: 2020-11-10

## 2020-11-06 RX ORDER — BENZONATATE 100 MG/1
100 CAPSULE ORAL 3 TIMES DAILY
Status: DISCONTINUED | OUTPATIENT
Start: 2020-11-06 | End: 2020-11-10 | Stop reason: HOSPADM

## 2020-11-06 RX ORDER — CHOLECALCIFEROL (VITAMIN D3) 25 MCG
1000 TABLET ORAL DAILY
Status: DISCONTINUED | OUTPATIENT
Start: 2020-11-06 | End: 2020-11-10 | Stop reason: HOSPADM

## 2020-11-06 RX ORDER — GLUCAGON 1 MG
1 KIT INJECTION
Status: DISCONTINUED | OUTPATIENT
Start: 2020-11-06 | End: 2020-11-06

## 2020-11-06 RX ORDER — IBUPROFEN 200 MG
24 TABLET ORAL
Status: DISCONTINUED | OUTPATIENT
Start: 2020-11-06 | End: 2020-11-10 | Stop reason: HOSPADM

## 2020-11-06 RX ORDER — HYDRALAZINE HYDROCHLORIDE 25 MG/1
25 TABLET, FILM COATED ORAL EVERY 8 HOURS PRN
Status: DISCONTINUED | OUTPATIENT
Start: 2020-11-06 | End: 2020-11-10 | Stop reason: HOSPADM

## 2020-11-06 RX ORDER — BENZONATATE 100 MG/1
100 CAPSULE ORAL 3 TIMES DAILY PRN
Status: DISCONTINUED | OUTPATIENT
Start: 2020-11-06 | End: 2020-11-06

## 2020-11-06 RX ORDER — GUAIFENESIN/DEXTROMETHORPHAN 100-10MG/5
5 SYRUP ORAL EVERY 4 HOURS PRN
Status: DISCONTINUED | OUTPATIENT
Start: 2020-11-06 | End: 2020-11-07

## 2020-11-06 RX ORDER — TALC
6 POWDER (GRAM) TOPICAL NIGHTLY PRN
Status: DISCONTINUED | OUTPATIENT
Start: 2020-11-06 | End: 2020-11-10 | Stop reason: HOSPADM

## 2020-11-06 RX ORDER — ONDANSETRON 2 MG/ML
4 INJECTION INTRAMUSCULAR; INTRAVENOUS EVERY 8 HOURS PRN
Status: DISCONTINUED | OUTPATIENT
Start: 2020-11-06 | End: 2020-11-10 | Stop reason: HOSPADM

## 2020-11-06 RX ORDER — ASCORBIC ACID 500 MG
500 TABLET ORAL 2 TIMES DAILY
Status: DISCONTINUED | OUTPATIENT
Start: 2020-11-06 | End: 2020-11-10 | Stop reason: HOSPADM

## 2020-11-06 RX ORDER — GLUCAGON 1 MG
1 KIT INJECTION
Status: DISCONTINUED | OUTPATIENT
Start: 2020-11-06 | End: 2020-11-10 | Stop reason: HOSPADM

## 2020-11-06 RX ORDER — SODIUM CHLORIDE 0.9 % (FLUSH) 0.9 %
10 SYRINGE (ML) INJECTION
Status: DISCONTINUED | OUTPATIENT
Start: 2020-11-06 | End: 2020-11-06

## 2020-11-06 RX ORDER — IPRATROPIUM BROMIDE AND ALBUTEROL SULFATE 2.5; .5 MG/3ML; MG/3ML
3 SOLUTION RESPIRATORY (INHALATION)
Status: DISCONTINUED | OUTPATIENT
Start: 2020-11-06 | End: 2020-11-10 | Stop reason: HOSPADM

## 2020-11-06 RX ORDER — AZITHROMYCIN 250 MG/1
500 TABLET, FILM COATED ORAL
Status: COMPLETED | OUTPATIENT
Start: 2020-11-06 | End: 2020-11-07

## 2020-11-06 RX ORDER — IBUPROFEN 200 MG
16 TABLET ORAL
Status: DISCONTINUED | OUTPATIENT
Start: 2020-11-06 | End: 2020-11-06

## 2020-11-06 RX ORDER — ACETAMINOPHEN 500 MG
1000 TABLET ORAL EVERY 8 HOURS PRN
Status: DISCONTINUED | OUTPATIENT
Start: 2020-11-06 | End: 2020-11-10 | Stop reason: HOSPADM

## 2020-11-06 RX ORDER — ENOXAPARIN SODIUM 100 MG/ML
40 INJECTION SUBCUTANEOUS 2 TIMES DAILY
Status: DISCONTINUED | OUTPATIENT
Start: 2020-11-06 | End: 2020-11-10 | Stop reason: HOSPADM

## 2020-11-06 RX ORDER — IBUPROFEN 200 MG
16 TABLET ORAL
Status: DISCONTINUED | OUTPATIENT
Start: 2020-11-06 | End: 2020-11-10 | Stop reason: HOSPADM

## 2020-11-06 RX ORDER — SODIUM CHLORIDE 0.9 % (FLUSH) 0.9 %
10 SYRINGE (ML) INJECTION
Status: DISCONTINUED | OUTPATIENT
Start: 2020-11-06 | End: 2020-11-10 | Stop reason: HOSPADM

## 2020-11-06 RX ORDER — IBUPROFEN 200 MG
24 TABLET ORAL
Status: DISCONTINUED | OUTPATIENT
Start: 2020-11-06 | End: 2020-11-06

## 2020-11-06 RX ORDER — HYDROXYZINE PAMOATE 25 MG/1
25 CAPSULE ORAL EVERY 8 HOURS PRN
Status: DISCONTINUED | OUTPATIENT
Start: 2020-11-06 | End: 2020-11-10 | Stop reason: HOSPADM

## 2020-11-06 RX ADMIN — IPRATROPIUM BROMIDE AND ALBUTEROL SULFATE 3 ML: .5; 2.5 SOLUTION RESPIRATORY (INHALATION) at 07:11

## 2020-11-06 RX ADMIN — GUAIFENESIN AND DEXTROMETHORPHAN 5 ML: 100; 10 SYRUP ORAL at 12:11

## 2020-11-06 RX ADMIN — HYDROXYZINE PAMOATE 25 MG: 25 CAPSULE ORAL at 12:11

## 2020-11-06 RX ADMIN — AZITHROMYCIN MONOHYDRATE 500 MG: 250 TABLET ORAL at 04:11

## 2020-11-06 RX ADMIN — MELATONIN TAB 3 MG 6 MG: 3 TAB at 10:11

## 2020-11-06 RX ADMIN — CEFTRIAXONE SODIUM 1 G: 1 INJECTION, POWDER, FOR SOLUTION INTRAMUSCULAR; INTRAVENOUS at 12:11

## 2020-11-06 RX ADMIN — THERA TABS 1 TABLET: TAB at 08:11

## 2020-11-06 RX ADMIN — OXYCODONE HYDROCHLORIDE AND ACETAMINOPHEN 500 MG: 500 TABLET ORAL at 10:11

## 2020-11-06 RX ADMIN — ENOXAPARIN SODIUM 40 MG: 40 INJECTION SUBCUTANEOUS at 10:11

## 2020-11-06 RX ADMIN — OXYCODONE HYDROCHLORIDE AND ACETAMINOPHEN 500 MG: 500 TABLET ORAL at 12:11

## 2020-11-06 RX ADMIN — HYDROXYZINE PAMOATE 25 MG: 25 CAPSULE ORAL at 10:11

## 2020-11-06 RX ADMIN — BENZONATATE 100 MG: 100 CAPSULE ORAL at 04:11

## 2020-11-06 RX ADMIN — ENOXAPARIN SODIUM 40 MG: 40 INJECTION SUBCUTANEOUS at 08:11

## 2020-11-06 RX ADMIN — OXYCODONE HYDROCHLORIDE AND ACETAMINOPHEN 500 MG: 500 TABLET ORAL at 08:11

## 2020-11-06 RX ADMIN — BENZONATATE 100 MG: 100 CAPSULE ORAL at 10:11

## 2020-11-06 RX ADMIN — CHOLECALCIFEROL TAB 25 MCG (1000 UNIT) 1000 UNITS: 25 TAB at 08:11

## 2020-11-06 RX ADMIN — DEXAMETHASONE 6 MG: 4 TABLET ORAL at 10:11

## 2020-11-06 RX ADMIN — IPRATROPIUM BROMIDE AND ALBUTEROL SULFATE 3 ML: .5; 2.5 SOLUTION RESPIRATORY (INHALATION) at 02:11

## 2020-11-06 RX ADMIN — REMDESIVIR 200 MG: 100 INJECTION, POWDER, LYOPHILIZED, FOR SOLUTION INTRAVENOUS at 04:11

## 2020-11-06 NOTE — NURSING
Spoke with pt's wife. Updated on POC. Requested for pt to have respiratory treatments. Will notify team.

## 2020-11-06 NOTE — PLAN OF CARE
Currently not stable for discharge. Presently on 2-4LNC (weaning as tolerated) - intermittent fever curretnly 97.9 as high as 102.0 yesterday on admission - tylenol to manage fevers. Will have 6 minute walk test to determine home oxygen needs. Will continue to follow    Emeli Medina RN  Case Management  Ext [phone]       11/06/20 3153   Post-Acute Status   Post-Acute Authorization Other   Post-Acute Placement Status Awaiting Internal Medical Clearance   Discharge Delays None known at this time   Discharge Plan   Discharge Plan A Home   Discharge Plan B Home with family

## 2020-11-06 NOTE — ED NOTES
Pt and pt's wife updated of room status. Pt to be moved to ED bed 22 once terminally cleaned by EVS.

## 2020-11-06 NOTE — ED PROVIDER NOTES
Encounter Date: 11/5/2020       History     Chief Complaint   Patient presents with    COVID-19 Concerns     Pt reports he was diagnosed with COVID on 11/2/2020. Pt reports worsening body aches, and fever despite taking Tylenol. Pt tmax 103 at home. Pt reports last dose of Tylenol at 0900 today. Pt reports worsening cough and SOB. Pt checked his puilse ox at home and states it was ranging from 85-89%. Pt 88% on RA in triage. 2L applied, sats 94%.     30-year-old male arrives to the emergency department for evaluation of shortness of breath.  The patient was admitted to the hospital a couple of days ago after being diagnosed with COVID-19.  The past 2 days he reports worsening hypoxia and shortness of breath.  He has been monitoring his O2 saturation at home and it dropped to the 80s today.  Upon arrival to the ER he is hypoxic at 85% on room air.  The patient was started on 3 L nasal cannula with improvement 4 to to low 90s.    He reports COTTRELL.  He a cough.  He reports generalized myalgias and fevers.  He is febrile on arrival to the ED.        Review of patient's allergies indicates:  No Known Allergies  No past medical history on file.  Past Surgical History:   Procedure Laterality Date    SHOULDER SURGERY       Family History   Problem Relation Age of Onset    Diabetes Mother     Diabetes Father     Cancer Maternal Grandmother     Stroke Neg Hx     Heart disease Neg Hx     Hyperlipidemia Neg Hx     Hypertension Neg Hx      Social History     Tobacco Use    Smoking status: Never Smoker    Smokeless tobacco: Never Used   Substance Use Topics    Alcohol use: Not Currently    Drug use: No     Review of Systems   Constitutional: Positive for activity change, appetite change, chills, fatigue and fever.   HENT: Negative for sore throat.    Respiratory: Positive for shortness of breath.    Cardiovascular: Negative for chest pain.   Gastrointestinal: Negative for nausea.   Genitourinary: Negative for dysuria.    Musculoskeletal: Negative for back pain.   Skin: Negative for rash.   Neurological: Negative for weakness.   Hematological: Does not bruise/bleed easily.       Physical Exam     Initial Vitals [11/05/20 2021]   BP Pulse Resp Temp SpO2   125/72 107 (!) 22 (!) 102.5 °F (39.2 °C) (!) 94 %      MAP       --         Physical Exam    Constitutional: Vital signs are normal. He appears well-developed and well-nourished. He is not diaphoretic. No distress.   HENT:   Head: Normocephalic and atraumatic.   Right Ear: Hearing normal.   Left Ear: Hearing normal.   Eyes: Conjunctivae are normal.   Cardiovascular: Normal rate and regular rhythm.   Pulmonary/Chest: He is in respiratory distress.   Abdominal: Soft. Normal appearance and bowel sounds are normal.   Musculoskeletal: Normal range of motion.   Neurological: He is alert and oriented to person, place, and time.   Skin: Skin is warm and intact.   Psychiatric: He has a normal mood and affect. His speech is normal and behavior is normal. Cognition and memory are normal.         ED Course   Procedures  Labs Reviewed   CBC W/ AUTO DIFFERENTIAL - Abnormal; Notable for the following components:       Result Value    Platelets 136 (*)     Immature Granulocytes 0.6 (*)     Lymph # 0.9 (*)     Gran % 79.7 (*)     Lymph % 11.9 (*)     All other components within normal limits   COMPREHENSIVE METABOLIC PANEL - Abnormal; Notable for the following components:    Sodium 131 (*)     AST 64 (*)      (*)     All other components within normal limits   C-REACTIVE PROTEIN - Abnormal; Notable for the following components:    .4 (*)     All other components within normal limits   FERRITIN - Abnormal; Notable for the following components:    Ferritin 527 (*)     All other components within normal limits   LACTATE DEHYDROGENASE - Abnormal; Notable for the following components:    LD 1,378 (*)     All other components within normal limits   SARS-COV-2 RDRP GENE - Abnormal; Notable  for the following components:    POC Rapid COVID Positive (*)     All other components within normal limits   CK   LACTIC ACID, PLASMA   TROPONIN I          Imaging Results          X-Ray Chest PA And Lateral (Final result)  Result time 11/05/20 23:01:55   Procedure changed from X-Ray Chest AP Portable     Final result by Samir Samano MD (11/05/20 23:01:55)                 Impression:      Interval increase in bilateral infiltrates.      Electronically signed by: Samir Samano MD  Date:    11/05/2020  Time:    23:01             Narrative:    EXAMINATION:  XR CHEST PA AND LATERAL    CLINICAL HISTORY:  Suspected Covid-19 Virus Infection;    TECHNIQUE:  PA and lateral views of the chest were performed.    COMPARISON:  11/02/2020.    FINDINGS:  Interval increase in bilateral infiltrates.    Cardiomediastinal silhouette is unchanged.    Regional osseous structures are unchanged.                                 Medical Decision Making:   History:   Old Medical Records: I decided to obtain old medical records.  Initial Assessment:   30-year-old male presenting with shortness of breath  Differential Diagnosis:   COVID-19, pneumonia, electrolyte abnormality, pulmonary embolus  Independently Interpreted Test(s):   I have ordered and independently interpreted X-rays - see summary below.       <> Summary of X-Ray Reading(s): Ground-glass opacities  I have ordered and independently interpreted EKG Reading(s) - see summary below       <> Summary of EKG Reading(s): Shows normal sinus rhythm, no STEMI  Clinical Tests:   Lab Tests: Ordered and Reviewed  Radiological Study: Ordered and Reviewed  Medical Tests: Ordered and Reviewed  ED Management:  Plan:  Routine labs and chest x-ray  All of Tylenol and dexamethasone.  Supplemental oxygen  Case discussed with Hospital Medicine.  The patient will be admitted for new O2 demand and hypoxia.  Other:   I discussed test(s) with the performing physician.  I have discussed this case  with another health care provider.                             Clinical Impression:       ICD-10-CM ICD-9-CM   1. Suspected COVID-19 virus infection  Z20.828 V01.79                      Disposition:   Disposition: Admitted  Condition: Stable     ED Disposition Condition    Admit                             Los Patrick PA-C  11/05/20 7308

## 2020-11-06 NOTE — H&P
Hospital Medicine  History and Physical  [facility]      Patient Name: Robbie Connell  MRN:  [id number]  Hospital Medicine Team: [facility]OS[facility] MED R Shankar Diego MD  Date of Admission:  11/5/2020     Length of Stay:  LOS: 1 day     Principal Problem: Suspected Covid-19 Virus Infection    Chief complaint    Worsening shortness of breath and hypoxia     HPI    Patient is a 29 yo male with no significant past medical history coming in after feeling worse for the past couple of days and noticing his home o2 monitor was reading 89%. He was recently admitted and discharged from this facility for COVID symptoms. He states he was not on oxygen during that hospital stay but was sent home with the monitor and noted that his oxygen went down so he came to the hospital. He states that the oxygen placed in the ED, made him fell better. He was noted to be hypoxic to 85 in the ED, was placed on oxygen and medicine called for admission. He states compliance with azithromycin since leaving.     Review of Systems    Constitutional: Positive for fever, chills, fatigue, poor appetite   HENT: Negative for sore throat, negative for trouble swallowing.    Eyes: Negative for photophobia, visual disturbance.   Respiratory: Positive for cough, shortness of breath  Cardiovascular: Negative for chest pain, palpitations, leg swelling.   Gastrointestinal: negative for diarrhea. Negative for abdominal pain, constipation, nausea, vomiting.   Endocrine: Negative for cold intolerance, heat intolerance.   Genitourinary: Negative for dysuria, frequency.   Musculoskeletal: Negative for arthralgias, myalgias.   Skin: Negative for rash  Neurological: Negative for dizziness, syncope, light-headedness.   Psychiatric/Behavioral: Negative for confusion, hallucinations, anxiety    No past medical history on file.  Past Surgical History:   Procedure Laterality Date    SHOULDER SURGERY       Family History   Problem Relation Age of Onset     Diabetes Mother     Diabetes Father     Cancer Maternal Grandmother     Stroke Neg Hx     Heart disease Neg Hx     Hyperlipidemia Neg Hx     Hypertension Neg Hx      Social History     Socioeconomic History    Marital status:      Spouse name: Not on file    Number of children: Not on file    Years of education: Not on file    Highest education level: Not on file   Occupational History    Not on file   Social Needs    Financial resource strain: Not on file    Food insecurity     Worry: Not on file     Inability: Not on file    Transportation needs     Medical: Not on file     Non-medical: Not on file   Tobacco Use    Smoking status: Never Smoker    Smokeless tobacco: Never Used   Substance and Sexual Activity    Alcohol use: Not Currently    Drug use: No    Sexual activity: Not on file   Lifestyle    Physical activity     Days per week: Not on file     Minutes per session: Not on file    Stress: Not on file   Relationships    Social connections     Talks on phone: Not on file     Gets together: Not on file     Attends [employer] service: Not on file     Active member of club or organization: Not on file     Attends meetings of clubs or organizations: Not on file     Relationship status: Not on file   Other Topics Concern    Not on file   Social History Narrative    Not on file       Medications  No current facility-administered medications on file prior to encounter.      Current Outpatient Medications on File Prior to Encounter   Medication Sig Dispense Refill    acetaminophen (TYLENOL) 500 MG tablet Take 1 tablet (500 mg total) by mouth every 6 (six) hours as needed (pain or temp > 101). OTC  0    albuterol (PROVENTIL/VENTOLIN HFA) 90 mcg/actuation inhaler Inhale 2 puffs into the lungs every 6 (six) hours as needed for Wheezing or Shortness of Breath. Rescue 18 g 0    ascorbic acid, vitamin C, (VITAMIN C) 500 MG tablet Take 1 tablet (500 mg total) by mouth 2 (two) times daily.       azithromycin (Z-JUAN CARLOS) 250 MG tablet Take 2 tablets (500 mg total) by mouth once daily. for 5 days 10 tablet 0    multivitamin Tab Take 1 tablet by mouth once daily. Over the counter      pulse oximeter (PULSE OXIMETER) device by Apply Externally route 2 (two) times a day. Use twice daily at 8 AM and 3 PM and record the value in MyChart as directed. 1 each 0    vitamin D (VITAMIN D3) 1000 units Tab Take 1 tablet (1,000 Units total) by mouth once daily. Over the counter         Allergies  Patient has no known allergies.    Physical Examination  Temp:  [102.4 °F (39.1 °C)-102.5 °F (39.2 °C)]   Pulse:  []   Resp:  [20-22]   BP: (121-131)/(70-76)   SpO2:  [94 %-96 %]     Gen: NAD, conversant  Head: NC, AT  Eyes: PERRLA, EOMI  Throat: MMM, OP clear  CV: RRR, no M/R/G, no peripheral edema, no JVD  Resp: coarse bilateral breath sounds, no increased work of breathing on 3L  GI: Soft, NT, ND, +BS  Ext: MAEW, no c/c/e  Neuro: AAOx3, CN grossly intact, no focal neurologic deficits  Psychiatry: Normal mood, normal affect    Laboratory:  Recent Labs   Lab 11/02/20 1048 11/03/20 0532 11/05/20 2231   WBC 5.17 4.16 7.12   LYMPH 21.1  1.1 19.2  0.8* 11.9*  0.9*   HGB 14.4 14.2 14.1   HCT 40.3 43.3 42.1   * 140* 136*     Recent Labs   Lab 11/02/20 1048 11/03/20  0532 11/05/20 2231    139 131*   K 3.8 4.2 3.9    105 96   CO2 25 22* 24   BUN 9 12 9   CREATININE 0.9 0.8 0.8   GLU 99 138* 103   CALCIUM 8.0* 9.0 8.8   MG  --  1.9  --    PHOS  --  3.4  --      Recent Labs   Lab 11/02/20  1048 11/03/20  0532 11/05/20 2231   ALKPHOS 87 84 88   ALT 25 36 142*   AST 28 31 64*   ALBUMIN 3.9 3.7 3.8   PROT 7.0 7.2 7.6   BILITOT 0.4 0.4 0.6      Recent Labs     11/03/20  0532 11/05/20  2231   DDIMER 0.34  --    FERRITIN  --  527*   CRP  --  120.4*   LDH  --  1,378*   BNP <10  --    TROPONINI  --  0.007   LACTATE  --  1.2       All labs within the last 24 hours were reviewed.     Microbiology:  Lab Results    Component Value Date    [id number] Positive (A) 11/05/2020       Microbiology Results (last 7 days)     Procedure Component Value Units Date/Time    Blood culture (site 1) [[id number]]     Order Status: Canceled Specimen: Blood     Blood culture (site 2) [[id number]]     Order Status: Canceled Specimen: Blood             Imaging      No results found for this or any previous visit.    X-Ray Chest PA And Lateral  Narrative: EXAMINATION:  XR CHEST PA AND LATERAL    CLINICAL HISTORY:  Suspected Covid-19 Virus Infection;    TECHNIQUE:  PA and lateral views of the chest were performed.    COMPARISON:  11/02/2020.    FINDINGS:  Interval increase in bilateral infiltrates.    Cardiomediastinal silhouette is unchanged.    Regional osseous structures are unchanged.  Impression: Interval increase in bilateral infiltrates.    Electronically signed by: Samir Samano MD  Date:    11/05/2020  Time:    23:01      All imaging within the last 24 hours was reviewed.       Assessment and Plan:    Active Hospital Problems    Diagnosis  POA    *Suspected COVID-19 virus infection [Z20.828]  Yes    Acute respiratory failure with hypoxia [J96.01]  Yes    Fever [R50.9]  Yes    Cough [R05]  Yes    Labile blood pressure [R09.89]  Yes    Family history of diabetes mellitus [Z83.3]  Not Applicable      Resolved Hospital Problems   No resolved problems to display.       Suspected COVID-19 Virus Infection  Viral Pneumonia due to COVID-19  - COVID-19 testing: positive  - Isolation: Airborne/Droplet. Surgical mask on patient. Notify Infection Control  - Diagnostics: Trend Q48hrs if stable, more frequently if patient decompensating     Laboratory/Study Frequency Result   CBC Admit & Q48h    CMP Admit & Q48h    Magnesium level Admit    D-dimer Admit & Q48h    Ferritin Admit & Q48h    CRP Admit & Q48h    CPK Admit & Q24h if elevated    LDH Admit & Q48h    Vitamin D Admit    BNP Admit    Troponin Admit & Q6h if elevated    Glucose-6-phos  dehydrogenase Admit    Procalcitonin Admit    Lipid Panel If starting statin    Rapid influenza Admit    Resp Infection Panel Admit if BMT/organ transplant    Legionella Antigen Admit    Sputum Culture Admit    Blood Culture Admit    Urinalysis & Culture Admit    ECG Admit & Q48h if on HCQ    CXR Admit    Lymphopenia, hyponatremia, hyperferritinemia, elevated troponin, elevated d-dimer, age, and medical comorbidities are significant predictors of poor clinical outcome    - Management: Per [facility] COVID Treatment Protocol (4/15/20)    - Monitoring:   - Telemetry & Continuous Pulse Oximetry    - Nutrition:    - Multivitamin PO daily   - Add Boost supplement   - Vitamin D 1000IU daily if deficient   - Ascorbic acid 500mg PO bid    - Supportive Care:   - acetaminophen 650mg PO Q6hr PRN fever/headache   - loperamide PRN viral diarrhea   - IVF if indicated, restrictive strategy preferred, no maintenance IV if able   - VTE PPx: enoxaparin or heparin SQ unless contraindicated    - Antibiotics:  - if indications, CXR findings, elevated procal. See protocol for alternatives.   - Discontinue early if low concern for bacterial co-infection   - ceftriaxone 1g Q24h x 5 days  - azithromycin 500mg po x1, then 250mg po daily x 4 days    - Investigational Therapies:   - If patient meets criteria    Acute Hypoxemic Respiratory Failure  - Order RT consult via Respiratory Communication for COVID Protocols  - Order Incentive Spirometer Q4h  - Order Flutter Valve Q4h  - Continuous Pulse Oximetry  - Goal SpO2 92-96%  - Supplemental O2 via LFNC, VentiMask, or HFNC (see Respiratory Support Oxygen Therapies)  - If wheezing, albuterol INH Q6h scheduled & PRN  - Proning Protocol if patient is a candidate (see  Proning Protocol)   - GCS >13, able to self-prone  - If deterioration, may warrant trial of NIPPV and transfer to [facility] pressure room or immediate ICU consult    Advance Care Planning  Goals of care, counseling/discussion FULL CODE    VTE  High Risk Prophylaxis:   VTE Risk Mitigation (From admission, onward)         Ordered     enoxaparin injection 40 mg  Every 24 hours      11/06/20 0012     IP VTE HIGH RISK PATIENT  Once      11/06/20 0012     Place sequential compression device  Until discontinued      11/06/20 0012                  Shankar Diego MD  [facility] Medicine Staff  11/06/2020

## 2020-11-06 NOTE — PROGRESS NOTES
Hospital Medicine  Progress Note  [facility]      Patient Name: Robbie Connell  MRN:  [id number]  Hospital Medicine Team: [facility] HOSP MED R Linsey Rouse MD  Date of Admission:  11/5/2020     Length of Stay:  LOS: 1 day     Principal Problem:  Suspected COVID-19 virus infection      HPI: Patient is a 31 yo male with no significant past medical history coming in after feeling worse for the past couple of days and noticing his home o2 monitor was reading 89%. He was recently admitted and discharged from this facility for COVID symptoms. He states he was not on oxygen during that hospital stay but was sent home with the monitor and noted that his oxygen went down so he came to the hospital. He states that the oxygen placed in the ED, made him fell better. He was noted to be hypoxic to 85 in the ED, was placed on oxygen and medicine called for admission. He states compliance with azithromycin since leaving.     Hospital Course:  Patient admitted for evaluation of acute respiratory failure with hypoxia secondary to COVID-19.    Interval History:     Patient noted to be hemodynamically stable. Overnight, patient weaned down for 4 L O2 2 L O2 via nasal cannula.  Denies any shortness of breath at this time.  Reports has not gotten out of bed much but denies any dyspnea on minimal exertion.  He does have a cough.  Reports his sputum production has now resolved.  Feels weak, fatigued, malaised.   Denies any nausea, vomiting, diarrhea.  Able to tolerate p.o. intake well.  Encourage use of incentive spirometer, albuterol inhaler, p.r.n. antitussives.  Attempt to get out of bed, sit up in chair and walk around the room if possible.    Review of Systems:  ROS (Positive in Bold, otherwise negative)  Constitutional: fever, chills, night sweats, malaise, weakness  CV: chest pain, edema, palpitations  Resp: SOB, cough, sputum production  GI: changes in appetite, NVDC, pain, melena, hematochezia, GERD,  hematemesis  : Dysuria, hematuria, urinary urgency, frequency  MSK: arthralgia/myalgia, joint swelling  Neuro/Psych: anxiety, depression    Inpatient Medications:    Current Facility-Administered Medications:     acetaminophen tablet 1,000 mg, 1,000 mg, Oral, Q8H PRN, Shankar Diego MD    albuterol-ipratropium 2.5 mg-0.5 mg/3 mL nebulizer solution 3 mL, 3 mL, Nebulization, Q6H WAKE, Linsey Rouse MD    ascorbic acid (vitamin C) tablet 500 mg, 500 mg, Oral, BID, Shankar Diego MD, 500 mg at 11/06/20 0828    azithromycin tablet 500 mg, 500 mg, Oral, Q24H, Shankar Diego MD    benzonatate capsule 100 mg, 100 mg, Oral, TID, Linsey Rouse MD    cefTRIAXone injection 1 g, 1 g, Intravenous, Q24H, Linsey Rouse MD    dexAMETHasone tablet 6 mg, 6 mg, Oral, Daily, Shankar Diego MD, 6 mg at 11/06/20 1019    dextromethorphan-guaifenesin  mg/5 ml liquid 5 mL, 5 mL, Oral, Q4H PRN, Linsey Rouse MD    dextrose 50% injection 12.5 g, 12.5 g, Intravenous, PRN, Shankar Diego MD    dextrose 50% injection 25 g, 25 g, Intravenous, PRN, Shankar Diego MD    enoxaparin injection 40 mg, 40 mg, Subcutaneous, BID, Shankar Diego MD, 40 mg at 11/06/20 0827    glucagon (human recombinant) injection 1 mg, 1 mg, Intramuscular, PRN, Shankar Diego MD    glucose chewable tablet 16 g, 16 g, Oral, PRN, Shankar Diego MD    glucose chewable tablet 24 g, 24 g, Oral, PRN, Shankar Diego MD    hydrALAZINE tablet 25 mg, 25 mg, Oral, Q8H PRN, Shankar Diego MD    hydrOXYzine pamoate capsule 25 mg, 25 mg, Oral, Q8H PRN, Linsey Rouse MD    insulin aspart U-100 pen 0-5 Units, 0-5 Units, Subcutaneous, QID (AC + HS) PRN, Shankar Diego MD    melatonin tablet 6 mg, 6 mg, Oral, Nightly PRN, Shankar Diego MD    multivitamin tablet, 1 tablet, Oral, Daily, Shankar Diego MD, 1 tablet at 11/06/20 0828    ondansetron injection 4 mg, 4 mg, Intravenous, Q8H PRN, Shankar Diego MD    sodium chloride 0.9% flush 10 mL, 10 mL, Intravenous, PRN, Shankar  "MD Brandie    vitamin D 1000 units tablet 1,000 Units, 1,000 Units, Oral, Daily, Shankar Diego MD, 1,000 Units at 11/06/20 0828      Physical Exam:    No intake or output data in the 24 hours ending 11/06/20 1116  Wt Readings from Last 3 Encounters:   11/06/20 122.4 kg (269 lb 13.5 oz)   11/03/20 122.5 kg (270 lb 1 oz)   11/02/20 122.5 kg (270 lb)       /83 (BP Location: Right arm, Patient Position: Lying)   Pulse 62   Temp 97.6 °F (36.4 °C) (Oral)   Resp 18   Ht 5' 6" (1.676 m)   Wt 122.4 kg (269 lb 13.5 oz)   SpO2 (!) 93%   BMI 43.55 kg/m²     GEN: NAD, conversant  Resp: coarse bilateral breath sounds, no wheezes or rales, normal work of breathing   CV: RRR, no m/r/g, no edema  GI: soft, NTND  Skin: no rash    Laboratory:  Lab Results   Component Value Date    [id number] Positive (A) 11/05/2020       Recent Labs   Lab 11/03/20 0532 11/05/20 2231 11/06/20  0340   WBC 4.16 7.12 7.13   LYMPH 19.2  0.8* 11.9*  0.9* 8.7*  0.6*   HGB 14.2 14.1 14.3   HCT 43.3 42.1 43.2   * 136* 130*     Recent Labs   Lab 11/03/20 0532 11/05/20 2231 11/06/20  0340    131* 136   K 4.2 3.9 4.1    96 100   CO2 22* 24 23   BUN 12 9 11   CREATININE 0.8 0.8 0.8   * 103 157*   CALCIUM 9.0 8.8 9.0   MG 1.9  --  2.1   PHOS 3.4  --  3.8     Recent Labs   Lab 11/03/20 0532 11/05/20 2231 11/06/20  0340   ALKPHOS 84 88 85   ALT 36 142* 128*   AST 31 64* 54*   ALBUMIN 3.7 3.8 3.6   PROT 7.2 7.6 7.5   BILITOT 0.4 0.6 0.6        Recent Labs     11/05/20  2231   FERRITIN 527*   .4*   LDH 1,378*   TROPONINI 0.007       All labs within the last 24 hours were reviewed.     Microbiology:  Microbiology Results (last 7 days)     Procedure Component Value Units Date/Time    Blood culture (site 1) [[id number]]     Order Status: Canceled Specimen: Blood     Blood culture (site 2) [[id number]]     Order Status: Canceled Specimen: Blood             Imaging  ECG Results          EKG 12-lead (In process)  Result " time 11/06/20 08:12:33    In process by Interface, Lab In [facility] (11/06/20 08:12:33)                 Narrative:    Test Reason : Z20.828,    Vent. Rate : 098 BPM     Atrial Rate : 098 BPM     P-R Int : 130 ms          QRS Dur : 084 ms      QT Int : 314 ms       P-R-T Axes : 059 018 027 degrees     QTc Int : 400 ms    Normal sinus rhythm  Normal ECG  When compared with ECG of 02-NOV-2020 10:48,  No significant change was found    Referred By: [email or web address]   SELF           Confirmed By:                               No results found for this or any previous visit.    X-Ray Chest PA And Lateral  Narrative: EXAMINATION:  XR CHEST PA AND LATERAL    CLINICAL HISTORY:  Suspected Covid-19 Virus Infection;    TECHNIQUE:  PA and lateral views of the chest were performed.    COMPARISON:  11/02/2020.    FINDINGS:  Interval increase in bilateral infiltrates.    Cardiomediastinal silhouette is unchanged.    Regional osseous structures are unchanged.  Impression: Interval increase in bilateral infiltrates.    Electronically signed by: Samir Samano MD  Date:    11/05/2020  Time:    23:01      All imaging within the last 24 hours was reviewed.     Assessment and Plan:    Active Hospital Problems    Diagnosis  POA    *Suspected COVID-19 virus infection [Z20.828]  Yes    Acute respiratory failure with hypoxia [J96.01]  Yes    Fever [R50.9]  Yes    Cough [R05]  Yes    Labile blood pressure [R09.89]  Yes    Family history of diabetes mellitus [Z83.3]  Not Applicable      Resolved Hospital Problems   No resolved problems to display.       COVID-19 Virus Infection:  Viral Pneumonia due to COVID-19:  -Pt tested for COVID-19 and noted to be positive   -Isolation: Airborne/Droplet. Surgical mask on patient. Notify Infection Control  -[facility]  -Monitoring: Telemetry & Continuous Pulse Oximetry  -Antibiotics: started on ceftriaxone 1g Q24h x 5 days and azithromycin 500mg po x1, then  250mg po daily x 4 days  -Nutrition:    -Multivitamin PO daily   -Add Boost supplement   -Vitamin D 1000IU daily if deficient   -Ascorbic acid 500mg PO bid  -Supportive Care:   -Acetaminophen 650mg PO Q6hr PRN fever/headache   -Loperamide PRN viral diarrhea   -Robitussin, tessalon perls for cough   -IVF if indicated, restrictive strategy preferred, no maintenance IV if able   -Investigational Therapies:  -Atorvastatin 40mg po daily   -Due to hypoxia, pt started on dexamethasone 6mg PO daily up to 10 days or until pt is discharged from hospital (whichever is sooner)   -Pt meets criteria for remdesivir. Pt provided verbal consent to start this medication    Acute Hypoxemic Respiratory Failure:  -weaned down to 2 L O2 via nasal cannula.  -RT consult via Respiratory Communication for COVID Protocols  -If wheezing, albuterol INH Q6h scheduled & PRN  -Incentive Spirometer Q4h  -Flutter Valve Q4h  -Continuous pulse oximetry; titrate oxygen to keep sats between 92-96%  -Wean off O2 as tolerated    -Supplemental O2 via LFNC, VentiMask, or HFNC (see Respiratory Support Oxygen Therapies)  -Proning Protocol if patient is a candidate with GCS >13 and able to self-prone (see HM Proning Protocol)  -If deterioration, may warrant trial of NIPPV and transfer to neg pressure room or immediate ICU consult    Anxiety/depression:  -patient's mother recently passed away due to COVID.  His father was also sick with COVID and intubated in the ICU for long time, but is doing better now.  -patient is a lot of anxiety/and admitted to having panic attacks related to COVID  -Vistaril 25 mg q.8 hours p.r.n. available for now  -will need outpatient PCP/psych follow-up for chronic management of anxiety/depression    Goals of care, counseling/discussion  -Reviewed the typical clinical course of COVID19 with pt, including the potential for acute decompensation requiring intubation and mechanical ventilation  -Discussed again as part of routine daily  evaluation, patient/POA maintains code status of full    VTE High Risk Prophylaxis: enoxaparin 40mg sq QHS @ 2100 (bundled care) if GFR >30    Dispo: DC home once medically ready.     Subsequent Hospital Care:   Level 3 43565 Total visit time was 35 minutes or greater with greater than 50% of time spent in counseling and coordination of care.     Linsey Rouse MD  11/6/2020   Department of Hospital medicine

## 2020-11-06 NOTE — NURSING
No acute events overnight. Pt is AAO, VSS, afebrile, and on 4L NC in no respiratory distress. No complaints of pain. Independent and ambulatory. Plan of care reviewed with patient. Bed is locked, in lowest position, side rails are raised x2, and call bell is within reach. Will continue to monitor.

## 2020-11-06 NOTE — TELEPHONE ENCOUNTER
Patient's wife is calling,  was released from hospital a few days ago, his O2 sats, 89 to 90., states he feels like he is dying, Triage done, ED advice given with 911 as option, his wife verb understanding and states she will bring him. No further questions or concerns.      Reason for Disposition   SEVERE or constant chest pain (Exception: mild central chest pain, present only when coughing)    Additional Information   Negative: Severe difficulty breathing (e.g., struggling for each breath, speaks in single words)   Negative: Difficult to awaken or acting confused (e.g., disoriented, slurred speech)   Negative: Bluish (or gray) lips or face now   Negative: Shock suspected (e.g., cold/pale/clammy skin, too weak to stand, low BP, rapid pulse)   Negative: Sounds like a life-threatening emergency to the triager   Negative: [1] COVID-19 suspected (e.g., cough, fever, shortness of breath) AND [2] mild symptoms AND [3] public health department recommends testing   Negative: [1] COVID-19 exposure AND [2] no symptoms   Negative: COVID-19 and Breastfeeding, questions about    Protocols used: CORONAVIRUS (COVID-19) - DIAGNOSED OR [id number]

## 2020-11-06 NOTE — PROVIDER PROGRESS NOTES - EMERGENCY DEPT.
Encounter Date: 11/5/2020    ED Physician Progress Notes         EKG - STEMI Decision  Initial Reading: No STEMI present.

## 2020-11-06 NOTE — PLAN OF CARE
CM called and spoke with Bharati  (spouse) to patient [place] [id number] for Discharge Planning Assessment. Per Bharati she lives with her spouse Robbie in a single family home on a slab foundation with 3 steps to porch and point of entry.  Patient was independent with ADLS and DID NOT use in DME or in-home assistive equipment.  He is not on dialysis or coumadin. He take medications as prescribed / keeps refilled / has resources for all daily and prescriptive needs. [facility] [address] - Agreeable to bedside delivery. He  will have help from his spouse and other immediate family upon discharge?All questions addressed. Unit and CM direct numbers provided. Will continue to follow for course of hospitalization     11/2/2020 10:36 AM   Cough [R05]  Syncope [R55]  COVID-19 [U07.1]  COVID-19 [U07.1]         PCP:  Hector Frazier MD     Pharmacy:  [id number] - [place], [place] - [facility] [address] AT [facility] [address]  [address]  [facility] [address]  Phone: [phone] Fax: [phone]     [id number] [place] [address] [facility] [address] [facility] [address] [address]  Phone: [phone] Fax: [phone]     Emergency Contacts:  Extended Emergency Contact Information  Primary Emergency Contact: ShaziaBharati leyva  Address: [address] [place] [address], [place] [address] [facility]  Home Phone: [phone]  Mobile Phone: [phone]  Relation: Spouse     Insurance:  Payor: BLUE CROSS BLUE SHIELD / Plan: BCBS ALL OUT OF STATE / Product Type: SHERMAN /         Emeli Medina RN  Case Management  Ext: [phone]       11/06/20 7048   Discharge Assessment   Assessment Type Discharge Planning Assessment   Confirmed/corrected address and phone number on facesheet? Yes   Assessment information obtained from? Patient   Expected Length of Stay (days) 4   Communicated expected length of stay with patient/caregiver yes   Prior to hospitilization  cognitive status: Alert/Oriented;No Deficits   Prior to hospitalization functional status: Independent   Current cognitive status: Alert/Oriented;No Deficits   Current Functional Status: Independent   Facility Arrived From: HOME   Lives With spouse   Able to Return to Prior Arrangements yes   Is patient able to care for self after discharge? Yes   Who are your caregiver(s) and their phone number(s)? Bharati Connell (wife) [phone]   Patient's perception of discharge disposition home or selfcare   Readmission Within the Last 30 Days previous discharge plan unsuccessful   If yes, most recent facility name: [facility]   Patient currently being followed by outpatient case management? No   Patient currently receives any other outside agency services? No   Equipment Currently Used at Home none   Do you have any problems affording any of your prescribed medications? No   Is the patient taking medications as prescribed? yes   Does the patient have transportation home? Yes   Transportation Anticipated family or friend will provide   Dialysis Name and Scheduled days N/A   Does the patient receive services at the Coumadin Clinic? No   Discharge Plan A Home   Discharge Plan B Home with family   DME Needed Upon Discharge  none   Patient/Family in Agreement with Plan yes   Readmission Questionnaire   At the time of your discharge, did someone talk to you about what your health problems were? Yes   At the time of discharge, did someone talk to you about what to watch out for regarding worsening of your health problem? Yes   At the time of discharge, did someone talk to you about what to do if you experienced worsening of your health problem? Yes   At the time of discharge, did someone talk to you about which medication to take when you left the hospital and which ones to stop taking? Yes   At the time of discharge, did someone talk to you about when and where to follow up with a doctor after you left the hospital? Yes   What do you  believe caused you to be sick enough to be re-admitted? increased body aches, fever and SOB   How often do you need to have someone help you when you read instructions, pamphlets, or other written material from your doctor or pharmacy? Never   Do you have problems taking your medications as prescribed? No   Do you have any problems affording any of  your prescribed medications? No   Do you have problems obtaining/receiving your medications? No   Does the patient have transportation to healthcare appointments? Yes   Living Arrangements house   Does the patient have family/friends to help with healtcare needs after discharge? yes   Does your caregiver provide all the help you need? Yes   Are you currently feeling confused? No   Are you currently having problems thinking? No   Are you currently having memory problems? No   Have you felt down, depressed, or hopeless? 0   Have you felt little interest or pleasure in doing things? 0   In the last 7 days, my sleep quality was: fair

## 2020-11-06 NOTE — NURSING
Attempted to call pt's spouse back for update. No answer. Left voicemail. Will attempt to call back this afternoon.

## 2020-11-06 NOTE — PLAN OF CARE
Remdesivir FDA EUA Verbal Consent  The patient or parent/caregiver has been provided with the remdesivir Fact Sheet for Patients and Parents/Caregivers and has been counseled that the FDA has authorized the emergency use of remdesivir, which is not an FDA approved drug. The significant known and potential risks and benefits are unknown. The patient or parent/caregiver has been given the option to accept or refuse and has verbally agreed to receive remdesivir. Daily labs will be ordered and monitoring for Serious Adverse Events will be performed.    Linsey Rouse MD  11/6/2020 11:15 AM  Department of Hospital medicine

## 2020-11-07 ENCOUNTER — PATIENT MESSAGE (OUTPATIENT)
Dept: ADMINISTRATIVE | Facility: OTHER | Age: 30
End: 2020-11-07

## 2020-11-07 ENCOUNTER — NURSE TRIAGE (OUTPATIENT)
Dept: ADMINISTRATIVE | Facility: CLINIC | Age: 30
End: 2020-11-07

## 2020-11-07 LAB
ALBUMIN SERPL BCP-MCNC: 3.4 G/DL (ref 3.5–5.2)
ALP SERPL-CCNC: 78 U/L (ref 55–135)
ALT SERPL W/O P-5'-P-CCNC: 91 U/L (ref 10–44)
ANION GAP SERPL CALC-SCNC: 14 MMOL/L (ref 8–16)
AST SERPL-CCNC: 38 U/L (ref 10–40)
BACTERIA BLD CULT: NORMAL
BACTERIA BLD CULT: NORMAL
BASOPHILS # BLD AUTO: 0.01 K/UL (ref 0–0.2)
BASOPHILS NFR BLD: 0.1 % (ref 0–1.9)
BILIRUB SERPL-MCNC: 0.4 MG/DL (ref 0.1–1)
BUN SERPL-MCNC: 15 MG/DL (ref 6–20)
CALCIUM SERPL-MCNC: 9.1 MG/DL (ref 8.7–10.5)
CHLORIDE SERPL-SCNC: 102 MMOL/L (ref 95–110)
CHOLEST SERPL-MCNC: 169 MG/DL (ref 120–199)
CHOLEST/HDLC SERPL: 5 {RATIO} (ref 2–5)
CO2 SERPL-SCNC: 23 MMOL/L (ref 23–29)
CREAT SERPL-MCNC: 0.8 MG/DL (ref 0.5–1.4)
CRP SERPL-MCNC: 57.3 MG/L (ref 0–8.2)
DIFFERENTIAL METHOD: ABNORMAL
EOSINOPHIL # BLD AUTO: 0 K/UL (ref 0–0.5)
EOSINOPHIL NFR BLD: 0 % (ref 0–8)
ERYTHROCYTE [DISTWIDTH] IN BLOOD BY AUTOMATED COUNT: 13 % (ref 11.5–14.5)
EST. GFR  (AFRICAN AMERICAN): >60 ML/MIN/1.73 M^2
EST. GFR  (NON AFRICAN AMERICAN): >60 ML/MIN/1.73 M^2
ESTIMATED AVG GLUCOSE: 100 MG/DL (ref 68–131)
GLUCOSE SERPL-MCNC: 127 MG/DL (ref 70–110)
HBA1C MFR BLD HPLC: 5.1 % (ref 4–5.6)
HCT VFR BLD AUTO: 43.8 % (ref 40–54)
HDLC SERPL-MCNC: 34 MG/DL (ref 40–75)
HDLC SERPL: 20.1 % (ref 20–50)
HGB BLD-MCNC: 14.4 G/DL (ref 14–18)
IMM GRANULOCYTES # BLD AUTO: 0.05 K/UL (ref 0–0.04)
IMM GRANULOCYTES NFR BLD AUTO: 0.6 % (ref 0–0.5)
LDLC SERPL CALC-MCNC: 107.6 MG/DL (ref 63–159)
LYMPHOCYTES # BLD AUTO: 1 K/UL (ref 1–4.8)
LYMPHOCYTES NFR BLD: 11.3 % (ref 18–48)
MAGNESIUM SERPL-MCNC: 2.2 MG/DL (ref 1.6–2.6)
MCH RBC QN AUTO: 29 PG (ref 27–31)
MCHC RBC AUTO-ENTMCNC: 32.9 G/DL (ref 32–36)
MCV RBC AUTO: 88 FL (ref 82–98)
MONOCYTES # BLD AUTO: 1 K/UL (ref 0.3–1)
MONOCYTES NFR BLD: 10.9 % (ref 4–15)
NEUTROPHILS # BLD AUTO: 6.9 K/UL (ref 1.8–7.7)
NEUTROPHILS NFR BLD: 77.1 % (ref 38–73)
NONHDLC SERPL-MCNC: 135 MG/DL
NRBC BLD-RTO: 0 /100 WBC
PHOSPHATE SERPL-MCNC: 4.6 MG/DL (ref 2.7–4.5)
PLATELET # BLD AUTO: 177 K/UL (ref 150–350)
PMV BLD AUTO: 12 FL (ref 9.2–12.9)
POCT GLUCOSE: 154 MG/DL (ref 70–110)
POCT GLUCOSE: 181 MG/DL (ref 70–110)
POCT GLUCOSE: 191 MG/DL (ref 70–110)
POTASSIUM SERPL-SCNC: 4.9 MMOL/L (ref 3.5–5.1)
PROT SERPL-MCNC: 7.3 G/DL (ref 6–8.4)
RBC # BLD AUTO: 4.96 M/UL (ref 4.6–6.2)
SODIUM SERPL-SCNC: 139 MMOL/L (ref 136–145)
T4 FREE SERPL-MCNC: 0.97 NG/DL (ref 0.71–1.51)
TRIGL SERPL-MCNC: 137 MG/DL (ref 30–150)
TSH SERPL DL<=0.005 MIU/L-ACNC: 0.17 UIU/ML (ref 0.4–4)
WBC # BLD AUTO: 8.91 K/UL (ref 3.9–12.7)

## 2020-11-07 PROCEDURE — 86140 C-REACTIVE PROTEIN: CPT

## 2020-11-07 PROCEDURE — 20600001 HC STEP DOWN PRIVATE ROOM

## 2020-11-07 PROCEDURE — 84100 ASSAY OF PHOSPHORUS: CPT

## 2020-11-07 PROCEDURE — 94664 DEMO&/EVAL PT USE INHALER: CPT

## 2020-11-07 PROCEDURE — 63600175 PHARM REV CODE 636 W HCPCS: Performed by: HOSPITALIST

## 2020-11-07 PROCEDURE — 85025 COMPLETE CBC W/AUTO DIFF WBC: CPT

## 2020-11-07 PROCEDURE — 25000003 PHARM REV CODE 250: Performed by: HOSPITALIST

## 2020-11-07 PROCEDURE — 83735 ASSAY OF MAGNESIUM: CPT

## 2020-11-07 PROCEDURE — 80053 COMPREHEN METABOLIC PANEL: CPT

## 2020-11-07 PROCEDURE — 99900035 HC TECH TIME PER 15 MIN (STAT)

## 2020-11-07 PROCEDURE — 84443 ASSAY THYROID STIM HORMONE: CPT

## 2020-11-07 PROCEDURE — 25000242 PHARM REV CODE 250 ALT 637 W/ HCPCS: Performed by: HOSPITALIST

## 2020-11-07 PROCEDURE — 94799 UNLISTED PULMONARY SVC/PX: CPT

## 2020-11-07 PROCEDURE — 36415 COLL VENOUS BLD VENIPUNCTURE: CPT

## 2020-11-07 PROCEDURE — 63600175 PHARM REV CODE 636 W HCPCS: Performed by: INTERNAL MEDICINE

## 2020-11-07 PROCEDURE — 99232 SBSQ HOSP IP/OBS MODERATE 35: CPT | Mod: ,,, | Performed by: INTERNAL MEDICINE

## 2020-11-07 PROCEDURE — 25000003 PHARM REV CODE 250: Performed by: INTERNAL MEDICINE

## 2020-11-07 PROCEDURE — 80061 LIPID PANEL: CPT

## 2020-11-07 PROCEDURE — 99232 PR SUBSEQUENT HOSPITAL CARE,LEVL II: ICD-10-PCS | Mod: ,,, | Performed by: INTERNAL MEDICINE

## 2020-11-07 PROCEDURE — 27000221 HC OXYGEN, UP TO 24 HOURS

## 2020-11-07 PROCEDURE — 94640 AIRWAY INHALATION TREATMENT: CPT

## 2020-11-07 PROCEDURE — 94761 N-INVAS EAR/PLS OXIMETRY MLT: CPT

## 2020-11-07 PROCEDURE — 63700000 PHARM REV CODE 250 ALT 637 W/O HCPCS: Performed by: INTERNAL MEDICINE

## 2020-11-07 PROCEDURE — 83036 HEMOGLOBIN GLYCOSYLATED A1C: CPT

## 2020-11-07 PROCEDURE — 84439 ASSAY OF FREE THYROXINE: CPT

## 2020-11-07 RX ORDER — GUAIFENESIN/DEXTROMETHORPHAN 100-10MG/5
10 SYRUP ORAL EVERY 4 HOURS PRN
Status: DISCONTINUED | OUTPATIENT
Start: 2020-11-07 | End: 2020-11-10 | Stop reason: HOSPADM

## 2020-11-07 RX ADMIN — IPRATROPIUM BROMIDE AND ALBUTEROL SULFATE 3 ML: .5; 2.5 SOLUTION RESPIRATORY (INHALATION) at 08:11

## 2020-11-07 RX ADMIN — ACETAMINOPHEN 1000 MG: 500 TABLET ORAL at 06:11

## 2020-11-07 RX ADMIN — IPRATROPIUM BROMIDE AND ALBUTEROL SULFATE 3 ML: .5; 2.5 SOLUTION RESPIRATORY (INHALATION) at 03:11

## 2020-11-07 RX ADMIN — REMDESIVIR 100 MG: 100 INJECTION, POWDER, LYOPHILIZED, FOR SOLUTION INTRAVENOUS at 03:11

## 2020-11-07 RX ADMIN — BENZONATATE 100 MG: 100 CAPSULE ORAL at 09:11

## 2020-11-07 RX ADMIN — OXYCODONE HYDROCHLORIDE AND ACETAMINOPHEN 500 MG: 500 TABLET ORAL at 09:11

## 2020-11-07 RX ADMIN — DEXAMETHASONE 6 MG: 4 TABLET ORAL at 09:11

## 2020-11-07 RX ADMIN — HYDROXYZINE PAMOATE 25 MG: 25 CAPSULE ORAL at 09:11

## 2020-11-07 RX ADMIN — CEFTRIAXONE SODIUM 1 G: 1 INJECTION, POWDER, FOR SOLUTION INTRAMUSCULAR; INTRAVENOUS at 01:11

## 2020-11-07 RX ADMIN — CHOLECALCIFEROL TAB 25 MCG (1000 UNIT) 1000 UNITS: 25 TAB at 09:11

## 2020-11-07 RX ADMIN — ENOXAPARIN SODIUM 40 MG: 40 INJECTION SUBCUTANEOUS at 08:11

## 2020-11-07 RX ADMIN — BENZONATATE 100 MG: 100 CAPSULE ORAL at 08:11

## 2020-11-07 RX ADMIN — BENZONATATE 100 MG: 100 CAPSULE ORAL at 03:11

## 2020-11-07 RX ADMIN — OXYCODONE HYDROCHLORIDE AND ACETAMINOPHEN 500 MG: 500 TABLET ORAL at 08:11

## 2020-11-07 RX ADMIN — AZITHROMYCIN MONOHYDRATE 500 MG: 250 TABLET ORAL at 03:11

## 2020-11-07 RX ADMIN — ENOXAPARIN SODIUM 40 MG: 40 INJECTION SUBCUTANEOUS at 09:11

## 2020-11-07 RX ADMIN — THERA TABS 1 TABLET: TAB at 09:11

## 2020-11-07 NOTE — PROGRESS NOTES
Hospital Medicine  Progress Note  [facility]      Patient Name: Robbie Connell  MRN:  [id number]  [facility] Medicine Team: [facility] VIRTUAL TEAM 10 Linda Rain MD  Date of Admission:  11/5/2020     Length of Stay:  LOS: 2 days       This service was provided through telemedicine.  Patient was transferred to the telemedicine service on:  11/07/2020   The patient consents to virtual visits.   The [facility]  Chief complaint:  Suspected COVID-19 virus infection   Start time: 1108a    End time:  1113a    Total time spent with patient: 5min  Present with the patient at the time of the telemed/virtual assessment: n/a  I have assessed these findings virtually using a telemed platform and with assistance of bedside nurse.  The attending portion of this evaluation, treatment, and documentation was performed per Linda Rain MD via audiovisual modality.        Principal Problem:  Suspected COVID-19 virus infection      HPI: Patient is a 31 yo male with no significant past medical history coming in after feeling worse for the past couple of days and noticing his home o2 monitor was reading 89%. He was recently admitted and discharged from this facility for COVID symptoms. He states he was not on oxygen during that hospital stay but was sent home with the monitor and noted that his oxygen went down so he came to the hospital. He states that the oxygen placed in the ED, made him fell better. He was noted to be hypoxic to 85 in the ED, was placed on oxygen and medicine called for admission. He states compliance with azithromycin since leaving.     Hospital Course:  Patient admitted for evaluation of acute respiratory failure with hypoxia secondary to COVID-19.    Interval History:     Afebrile and currently requires 3L/min supplemental oxygen  Patient noted to be hemodynamically stable. He remains with fatigue/malaise.  + dyspnea mostly with exertion; + dry cough; + headache  relieved with tylenol; poor sleep overnight; no taste/smell alterations; no N/V/diarrhea; encouraged to ambulated in room as tolerated and to prone if able; continue IS use    Review of Systems:  ROS (Positive in Bold, otherwise negative)  Constitutional: fever, chills, night sweats, malaise, weakness  CV: chest pain, edema, palpitations  Resp: SOB, cough, sputum production  GI: changes in appetite, NVDC, pain, melena, hematochezia, GERD, hematemesis  : Dysuria, hematuria, urinary urgency, frequency  MSK: arthralgia/myalgia, joint swelling  Neuro/Psych: anxiety, depression    Inpatient Medications:    Current Facility-Administered Medications:     acetaminophen tablet 1,000 mg, 1,000 mg, Oral, Q8H PRN, Shankar Diego MD, 1,000 mg at 11/07/20 0605    albuterol-ipratropium 2.5 mg-0.5 mg/3 mL nebulizer solution 3 mL, 3 mL, Nebulization, Q6H WAKE, Linsey Rouse MD, 3 mL at 11/07/20 0815    ascorbic acid (vitamin C) tablet 500 mg, 500 mg, Oral, BID, Shankar Diego MD, 500 mg at 11/07/20 0925    azithromycin tablet 500 mg, 500 mg, Oral, Q24H, Shankar Diego MD, 500 mg at 11/06/20 1629    benzonatate capsule 100 mg, 100 mg, Oral, TID, Linsey Rouse MD, 100 mg at 11/07/20 0925    cefTRIAXone injection 1 g, 1 g, Intravenous, Q24H, Linsey Rouse MD, 1 g at 11/07/20 1336    dexAMETHasone tablet 6 mg, 6 mg, Oral, Daily, Shankar Diego MD, 6 mg at 11/07/20 0925    dextromethorphan-guaifenesin  mg/5 ml liquid 10 mL, 10 mL, Oral, Q4H PRN, Linda Rain MD    dextrose 50% injection 12.5 g, 12.5 g, Intravenous, PRN, Shankar Deigo MD    dextrose 50% injection 25 g, 25 g, Intravenous, PRN, Shankar Diego MD    enoxaparin injection 40 mg, 40 mg, Subcutaneous, BID, Shankar Diego MD, 40 mg at 11/07/20 0925    glucagon (human recombinant) injection 1 mg, 1 mg, Intramuscular, PRN, Shankar Diego MD    glucose chewable tablet 16 g, 16 g, Oral, PRN, Shankar Diego MD    glucose chewable tablet 24 g, 24 g, Oral, PRN, Shankar  "MD Brandie    hydrALAZINE tablet 25 mg, 25 mg, Oral, Q8H PRN, Shankar Diego MD    hydrOXYzine pamoate capsule 25 mg, 25 mg, Oral, Q8H PRN, Linsey Rouse MD, 25 mg at 11/07/20 0925    insulin aspart U-100 pen 0-5 Units, 0-5 Units, Subcutaneous, QID (AC + HS) PRN, Shankar Diego MD    melatonin tablet 6 mg, 6 mg, Oral, Nightly PRN, Shankar Diego MD, 6 mg at 11/06/20 2210    multivitamin tablet, 1 tablet, Oral, Daily, Shankar Diego MD, 1 tablet at 11/07/20 0925    ondansetron injection 4 mg, 4 mg, Intravenous, Q8H PRN, Shankar Diego MD    remdesivir (EUA) 100 mg in sodium chloride 0.9% 250 mL infusion, 100 mg, Intravenous, Q24H, Linsey Rouse MD    sodium chloride 0.9% flush 10 mL, 10 mL, Intravenous, PRN, Shankar Diego MD    vitamin D 1000 units tablet 1,000 Units, 1,000 Units, Oral, Daily, Shankar Diego MD, 1,000 Units at 11/07/20 0925      Physical Exam:      Intake/Output Summary (Last 24 hours) at 11/7/2020 1455  Last data filed at 11/6/2020 1900  Gross per 24 hour   Intake 250 ml   Output --   Net 250 ml     Wt Readings from Last 3 Encounters:   11/06/20 122.4 kg (269 lb 13.5 oz)   11/03/20 122.5 kg (270 lb 1 oz)   11/02/20 122.5 kg (270 lb)       /62 (BP Location: Right arm, Patient Position: Lying)   Pulse 76   Temp 97.8 °F (36.6 °C) (Oral)   Resp (!) 24   Ht 5' 6" (1.676 m)   Wt 122.4 kg (269 lb 13.5 oz)   SpO2 97%   BMI 43.55 kg/m²     Physical Exam  Vitals signs and nursing note reviewed.   Constitutional:       General: He is not in acute distress.     Appearance: He is ill-appearing.   HENT:      Head: Normocephalic and atraumatic.      Right Ear: Hearing normal.      Left Ear: Hearing normal.      Nose: Nose normal.   Eyes:      General: No scleral icterus.        Right eye: No discharge.         Left eye: No discharge.      Extraocular Movements: Extraocular movements intact.   Cardiovascular:      Rate and Rhythm: Normal rate.   Pulmonary:      Effort: Pulmonary effort is normal. No " accessory muscle usage or respiratory distress.   Skin:     Findings: No rash.   Neurological:      General: No focal deficit present.      Mental Status: He is alert and oriented to person, place, and time.      Cranial Nerves: No cranial nerve deficit.      Motor: Weakness (generalized) present.   Psychiatric:         Mood and Affect: Mood normal.         Behavior: Behavior normal.         Thought Content: Thought content normal.         Judgment: Judgment normal.           Laboratory:  Lab Results   Component Value Date    [id number] Positive (A) 11/05/2020       Recent Labs   Lab 11/05/20 2231 11/06/20 0340 11/07/20  0423   WBC 7.12 7.13 8.91   LYMPH 11.9*  0.9* 8.7*  0.6* 11.3*  1.0   HGB 14.1 14.3 14.4   HCT 42.1 43.2 43.8   * 130* 177     Recent Labs   Lab 11/03/20  0532 11/05/20 2231 11/06/20 0340 11/07/20  0423    131* 136 139   K 4.2 3.9 4.1 4.9    96 100 102   CO2 22* 24 23 23   BUN 12 9 11 15   CREATININE 0.8 0.8 0.8 0.8   * 103 157* 127*   CALCIUM 9.0 8.8 9.0 9.1   MG 1.9  --  2.1 2.2   PHOS 3.4  --  3.8 4.6*     Recent Labs   Lab 11/05/20 2231 11/06/20 0340 11/07/20  0423   ALKPHOS 88 85 78   * 128* 91*   AST 64* 54* 38   ALBUMIN 3.8 3.6 3.4*   PROT 7.6 7.5 7.3   BILITOT 0.6 0.6 0.4        Recent Labs     11/05/20 2231 11/07/20  0100   FERRITIN 527*  --    .4* 57.3*   LDH 1,378*  --    TROPONINI 0.007  --        All labs within the last 24 hours were reviewed.     Microbiology:  Microbiology Results (last 7 days)     Procedure Component Value Units Date/Time    Blood culture (site 1) [[id number]]     Order Status: Canceled Specimen: Blood     Blood culture (site 2) [[id number]]     Order Status: Canceled Specimen: Blood             Imaging  ECG Results          EKG 12-lead (Final result)  Result time 11/06/20 13:38:44    Final result by Interface, Lab In [facility] (11/06/20 13:38:44)                 Narrative:    Test Reason : Z20.828,    Vent. Rate : 098  BPM     Atrial Rate : 098 BPM     P-R Int : 130 ms          QRS Dur : 084 ms      QT Int : 314 ms       P-R-T Axes : 059 018 027 degrees     QTc Int : 400 ms    Normal sinus rhythm  Normal ECG  When compared with ECG of 02-NOV-2020 10:48,  No significant change was found  Confirmed by CRYSTAL MONTANA MD ([id number]) on 11/6/2020 1:38:30 PM    Referred By: [email or web address]   SELF           Confirmed By:CRYSTAL MONTANA MD                              No results found for this or any previous visit.    X-Ray Chest PA And Lateral  Narrative: EXAMINATION:  XR CHEST PA AND LATERAL    CLINICAL HISTORY:  Suspected Covid-19 Virus Infection;    TECHNIQUE:  PA and lateral views of the chest were performed.    COMPARISON:  11/02/2020.    FINDINGS:  Interval increase in bilateral infiltrates.    Cardiomediastinal silhouette is unchanged.    Regional osseous structures are unchanged.  Impression: Interval increase in bilateral infiltrates.    Electronically signed by: Samir Samano MD  Date:    11/05/2020  Time:    23:01      All imaging within the last 24 hours was reviewed.     Assessment and Plan:    Active Hospital Problems    Diagnosis  POA    *Suspected COVID-19 virus infection [Z20.828]  Yes    Acute respiratory failure with hypoxia [J96.01]  Yes    Fever [R50.9]  Yes    Cough [R05]  Yes    Labile blood pressure [R09.89]  Yes    Family history of diabetes mellitus [Z83.3]  Not Applicable      Resolved Hospital Problems   No resolved problems to display.       COVID-19 Virus Infection:  Viral Pneumonia due to COVID-19:  -Pt tested for COVID-19 and noted to be positive   -Isolation: Airborne/Droplet. Surgical mask on patient. Notify Infection Control  -Management: per [facility] COVID Treatment Protocol (4/15/20)  -Monitoring: Telemetry & Continuous Pulse Oximetry  -Antibiotics: started on ceftriaxone 1g Q24h x 5 days and azithromycin 500mg po x1, then 250mg po daily x 4 days  -Nutrition:    -Multivitamin PO daily   -Add Boost  supplement   -Vitamin D 1000IU daily if deficient   -Ascorbic acid 500mg PO bid  -Supportive Care:   -Acetaminophen 650mg PO Q6hr PRN fever/headache   -Loperamide PRN viral diarrhea   -Robitussin, tessalon perls for cough   -IVF if indicated, restrictive strategy preferred, no maintenance IV if able   -Investigational Therapies:  -Atorvastatin 40mg po daily   -Due to hypoxia, pt started on dexamethasone 6mg PO daily up to 10 days or until pt is discharged from hospital (whichever is sooner)   -Pt meets criteria for remdesivir. Pt provided verbal consent to start this medication; currently on D#2    Acute Hypoxemic Respiratory Failure:  -currently requiring 3L by nasal cannula; wean as tolerated  -RT consult via Respiratory Communication for COVID Protocols  -If wheezing, albuterol INH Q6h scheduled & PRN  -Incentive Spirometer Q4h  -Flutter Valve Q4h  -Continuous pulse oximetry; titrate oxygen to keep sats between 92-96%  -Wean off O2 as tolerated    -Supplemental O2 via LFNC, VentiMask, or HFNC (see Respiratory Support Oxygen Therapies)  -Proning Protocol if patient is a candidate with GCS >13 and able to self-prone (see HM Proning Protocol)  -If deterioration, may warrant trial of NIPPV and transfer to neg pressure room or immediate ICU consult    Anxiety/depression:  -patient's mother recently passed away due to COVID.  His father was also sick with COVID and intubated in the ICU for long time, but is doing better now.  -patient is a lot of anxiety/and admitted to having panic attacks related to COVID  -Vistaril 25 mg q.8 hours p.r.n. available for now  -will need outpatient PCP/psych follow-up for chronic management of anxiety/depression    Goals of care, counseling/discussion  -Reviewed the typical clinical course of COVID19 with pt, including the potential for acute decompensation requiring intubation and mechanical ventilation  -Discussed again as part of routine daily evaluation, patient/POA maintains code  status of full    VTE High Risk Prophylaxis: enoxaparin 40mg sq QHS @ 2100 (bundled care) if GFR >30    Discharge Planning   FAIZA: 11/10/2020     Code Status: Full Code   Is the patient medically ready for discharge?:     Reason for patient still in hospital (select all that apply): Patient trending condition  Discharge Plan A: Home   Discharge Delays: None known at this time        Linda Rain MD  Hospital Medicine [facility] [phone]

## 2020-11-07 NOTE — NURSING
Attempted call wife to give her an update called both the cell number and the house number and was unable to reach anyone did leave message with at both numbers to return our call.

## 2020-11-07 NOTE — PLAN OF CARE
Problem: Adult Inpatient Plan of Care  Goal: Plan of Care Review  Outcome: Ongoing, Progressing  Goal: Patient-Specific Goal (Individualization)  Outcome: Ongoing, Progressing  Goal: Absence of Hospital-Acquired Illness or Injury  Outcome: Ongoing, Progressing  Goal: Optimal Comfort and Wellbeing  Outcome: Ongoing, Progressing  Goal: Readiness for Transition of Care  Outcome: Ongoing, Progressing  Goal: Rounds/Family Conference  Outcome: Ongoing, Progressing     Problem: Bariatric Environmental Safety  Goal: Safety Maintained with Care  Outcome: Ongoing, Progressing     Problem: Infection  Goal: Infection Symptom Resolution  Outcome: Ongoing, Progressing     ASSUMED CARE OF PT. VSS NAD ASSESSMENT DONE/CHARTED. UNEVENTFUL SHIFT. REPORT GIVEN TO ONCOMING RN

## 2020-11-07 NOTE — TELEPHONE ENCOUNTER
Reason for Disposition   General information question, no triage required and triager able to answer question    Additional Information   Negative: [1] Caller is not with the adult (patient) AND [2] reporting urgent symptoms   Negative: Lab result questions   Negative: Medication questions   Negative: Caller can't be reached by phone   Negative: Caller has already spoken to PCP or another triager   Negative: RN needs further essential information from caller in order to complete triage   Negative: Requesting regular office appointment   Negative: [1] Caller requesting NON-URGENT health information AND [2] PCP's office is the best resource   Negative: Health Information question, no triage required and triager able to answer question    Protocols used: INFORMATION ONLY CALL-A-  LM x2 at 540pm at [phone]  My  is on the 15 floor can I please have his doctor call me. No one is contacting me and keeping me updated. Spoke with charge nurse RN states she will give spouse a call.

## 2020-11-07 NOTE — PLAN OF CARE
Problem: Adult Inpatient Plan of Care  Goal: Plan of Care Review  Outcome: Ongoing, Progressing  Goal: Patient-Specific Goal (Individualization)  Outcome: Ongoing, Progressing  Goal: Absence of Hospital-Acquired Illness or Injury  Outcome: Ongoing, Progressing  Goal: Optimal Comfort and Wellbeing  Outcome: Ongoing, Progressing  Goal: Readiness for Transition of Care  Outcome: Ongoing, Progressing  Goal: Rounds/Family Conference  Outcome: Ongoing, Progressing     Problem: Bariatric Environmental Safety  Goal: Safety Maintained with Care  Outcome: Ongoing, Progressing     Problem: Infection  Goal: Infection Symptom Resolution  Outcome: Ongoing, Progressing     Problem: Adult Inpatient Plan of Care  Goal: Plan of Care Review  Outcome: Ongoing, Progressing  Goal: Patient-Specific Goal (Individualization)  Outcome: Ongoing, Progressing  Goal: Absence of Hospital-Acquired Illness or Injury  Outcome: Ongoing, Progressing  Goal: Optimal Comfort and Wellbeing  Outcome: Ongoing, Progressing  Goal: Readiness for Transition of Care  Outcome: Ongoing, Progressing  Goal: Rounds/Family Conference  Outcome: Ongoing, Progressing     Problem: Bariatric Environmental Safety  Goal: Safety Maintained with Care  Outcome: Ongoing, Progressing     Problem: Infection  Goal: Infection Symptom Resolution  Outcome: Ongoing, Progressing

## 2020-11-07 NOTE — CONSULTS
Robbie Connell  has been accepted for transfer to [facility] and will be followed through telemedicine services beginning 11/07/20  at 7am.    Linda Rain

## 2020-11-08 LAB
ABO + RH BLD: NORMAL
ABO + RH BLD: NORMAL
ALBUMIN SERPL BCP-MCNC: 3.2 G/DL (ref 3.5–5.2)
ALP SERPL-CCNC: 76 U/L (ref 55–135)
ALT SERPL W/O P-5'-P-CCNC: 80 U/L (ref 10–44)
ANION GAP SERPL CALC-SCNC: 11 MMOL/L (ref 8–16)
AST SERPL-CCNC: 32 U/L (ref 10–40)
BASOPHILS # BLD AUTO: 0.01 K/UL (ref 0–0.2)
BASOPHILS NFR BLD: 0.1 % (ref 0–1.9)
BILIRUB SERPL-MCNC: 0.3 MG/DL (ref 0.1–1)
BLD GP AB SCN CELLS X3 SERPL QL: NORMAL
BLD PROD TYP BPU: NORMAL
BLOOD UNIT EXPIRATION DATE: NORMAL
BLOOD UNIT TYPE CODE: 5100
BLOOD UNIT TYPE: NORMAL
BUN SERPL-MCNC: 18 MG/DL (ref 6–20)
CALCIUM SERPL-MCNC: 8.9 MG/DL (ref 8.7–10.5)
CHLORIDE SERPL-SCNC: 104 MMOL/L (ref 95–110)
CO2 SERPL-SCNC: 24 MMOL/L (ref 23–29)
CODING SYSTEM: NORMAL
CREAT SERPL-MCNC: 0.8 MG/DL (ref 0.5–1.4)
DIFFERENTIAL METHOD: ABNORMAL
DISPENSE STATUS: NORMAL
EOSINOPHIL # BLD AUTO: 0 K/UL (ref 0–0.5)
EOSINOPHIL NFR BLD: 0 % (ref 0–8)
ERYTHROCYTE [DISTWIDTH] IN BLOOD BY AUTOMATED COUNT: 12.7 % (ref 11.5–14.5)
EST. GFR  (AFRICAN AMERICAN): >60 ML/MIN/1.73 M^2
EST. GFR  (NON AFRICAN AMERICAN): >60 ML/MIN/1.73 M^2
GLUCOSE SERPL-MCNC: 145 MG/DL (ref 70–110)
HCT VFR BLD AUTO: 42.4 % (ref 40–54)
HGB BLD-MCNC: 13.7 G/DL (ref 14–18)
IMM GRANULOCYTES # BLD AUTO: 0.09 K/UL (ref 0–0.04)
IMM GRANULOCYTES NFR BLD AUTO: 0.9 % (ref 0–0.5)
LYMPHOCYTES # BLD AUTO: 1.2 K/UL (ref 1–4.8)
LYMPHOCYTES NFR BLD: 11.6 % (ref 18–48)
MAGNESIUM SERPL-MCNC: 2.1 MG/DL (ref 1.6–2.6)
MCH RBC QN AUTO: 29.3 PG (ref 27–31)
MCHC RBC AUTO-ENTMCNC: 32.3 G/DL (ref 32–36)
MCV RBC AUTO: 91 FL (ref 82–98)
MONOCYTES # BLD AUTO: 0.9 K/UL (ref 0.3–1)
MONOCYTES NFR BLD: 9.1 % (ref 4–15)
NEUTROPHILS # BLD AUTO: 7.8 K/UL (ref 1.8–7.7)
NEUTROPHILS NFR BLD: 78.3 % (ref 38–73)
NRBC BLD-RTO: 0 /100 WBC
PHOSPHATE SERPL-MCNC: 3.5 MG/DL (ref 2.7–4.5)
PLATELET # BLD AUTO: 208 K/UL (ref 150–350)
PMV BLD AUTO: 11.9 FL (ref 9.2–12.9)
POCT GLUCOSE: 114 MG/DL (ref 70–110)
POCT GLUCOSE: 118 MG/DL (ref 70–110)
POCT GLUCOSE: 126 MG/DL (ref 70–110)
POCT GLUCOSE: 161 MG/DL (ref 70–110)
POCT GLUCOSE: 205 MG/DL (ref 70–110)
POTASSIUM SERPL-SCNC: 4.4 MMOL/L (ref 3.5–5.1)
PROT SERPL-MCNC: 6.9 G/DL (ref 6–8.4)
RBC # BLD AUTO: 4.68 M/UL (ref 4.6–6.2)
SODIUM SERPL-SCNC: 139 MMOL/L (ref 136–145)
UNIT NUMBER: NORMAL
WBC # BLD AUTO: 9.94 K/UL (ref 3.9–12.7)

## 2020-11-08 PROCEDURE — 94640 AIRWAY INHALATION TREATMENT: CPT

## 2020-11-08 PROCEDURE — 25000003 PHARM REV CODE 250: Performed by: INTERNAL MEDICINE

## 2020-11-08 PROCEDURE — 94799 UNLISTED PULMONARY SVC/PX: CPT

## 2020-11-08 PROCEDURE — 25000242 PHARM REV CODE 250 ALT 637 W/ HCPCS: Performed by: HOSPITALIST

## 2020-11-08 PROCEDURE — 63600175 PHARM REV CODE 636 W HCPCS: Performed by: HOSPITALIST

## 2020-11-08 PROCEDURE — 80053 COMPREHEN METABOLIC PANEL: CPT

## 2020-11-08 PROCEDURE — 85025 COMPLETE CBC W/AUTO DIFF WBC: CPT

## 2020-11-08 PROCEDURE — P9017 PLASMA 1 DONOR FRZ W/IN 8 HR: HCPCS

## 2020-11-08 PROCEDURE — 99232 PR SUBSEQUENT HOSPITAL CARE,LEVL II: ICD-10-PCS | Mod: ,,, | Performed by: INTERNAL MEDICINE

## 2020-11-08 PROCEDURE — 20600001 HC STEP DOWN PRIVATE ROOM

## 2020-11-08 PROCEDURE — 27000221 HC OXYGEN, UP TO 24 HOURS

## 2020-11-08 PROCEDURE — 94761 N-INVAS EAR/PLS OXIMETRY MLT: CPT

## 2020-11-08 PROCEDURE — 94664 DEMO&/EVAL PT USE INHALER: CPT

## 2020-11-08 PROCEDURE — 99232 SBSQ HOSP IP/OBS MODERATE 35: CPT | Mod: ,,, | Performed by: INTERNAL MEDICINE

## 2020-11-08 PROCEDURE — 83735 ASSAY OF MAGNESIUM: CPT

## 2020-11-08 PROCEDURE — 63600175 PHARM REV CODE 636 W HCPCS: Performed by: INTERNAL MEDICINE

## 2020-11-08 PROCEDURE — 86901 BLOOD TYPING SEROLOGIC RH(D): CPT

## 2020-11-08 PROCEDURE — 99900035 HC TECH TIME PER 15 MIN (STAT)

## 2020-11-08 PROCEDURE — 36415 COLL VENOUS BLD VENIPUNCTURE: CPT

## 2020-11-08 PROCEDURE — 36430 TRANSFUSION BLD/BLD COMPNT: CPT

## 2020-11-08 PROCEDURE — 25000003 PHARM REV CODE 250: Performed by: HOSPITALIST

## 2020-11-08 PROCEDURE — 86850 RBC ANTIBODY SCREEN: CPT

## 2020-11-08 PROCEDURE — 84100 ASSAY OF PHOSPHORUS: CPT

## 2020-11-08 RX ORDER — HYDROCODONE BITARTRATE AND ACETAMINOPHEN 500; 5 MG/1; MG/1
TABLET ORAL
Status: DISCONTINUED | OUTPATIENT
Start: 2020-11-08 | End: 2020-11-10 | Stop reason: HOSPADM

## 2020-11-08 RX ADMIN — IPRATROPIUM BROMIDE AND ALBUTEROL SULFATE 3 ML: .5; 2.5 SOLUTION RESPIRATORY (INHALATION) at 08:11

## 2020-11-08 RX ADMIN — ENOXAPARIN SODIUM 40 MG: 40 INJECTION SUBCUTANEOUS at 08:11

## 2020-11-08 RX ADMIN — BENZONATATE 100 MG: 100 CAPSULE ORAL at 08:11

## 2020-11-08 RX ADMIN — THERA TABS 1 TABLET: TAB at 09:11

## 2020-11-08 RX ADMIN — OXYCODONE HYDROCHLORIDE AND ACETAMINOPHEN 500 MG: 500 TABLET ORAL at 09:11

## 2020-11-08 RX ADMIN — ENOXAPARIN SODIUM 40 MG: 40 INJECTION SUBCUTANEOUS at 09:11

## 2020-11-08 RX ADMIN — OXYCODONE HYDROCHLORIDE AND ACETAMINOPHEN 500 MG: 500 TABLET ORAL at 08:11

## 2020-11-08 RX ADMIN — REMDESIVIR 100 MG: 100 INJECTION, POWDER, LYOPHILIZED, FOR SOLUTION INTRAVENOUS at 03:11

## 2020-11-08 RX ADMIN — CHOLECALCIFEROL TAB 25 MCG (1000 UNIT) 1000 UNITS: 25 TAB at 09:11

## 2020-11-08 RX ADMIN — CEFTRIAXONE SODIUM 1 G: 1 INJECTION, POWDER, FOR SOLUTION INTRAMUSCULAR; INTRAVENOUS at 12:11

## 2020-11-08 RX ADMIN — IPRATROPIUM BROMIDE AND ALBUTEROL SULFATE 3 ML: .5; 2.5 SOLUTION RESPIRATORY (INHALATION) at 07:11

## 2020-11-08 RX ADMIN — SODIUM CHLORIDE: 9 INJECTION, SOLUTION INTRAVENOUS at 09:11

## 2020-11-08 RX ADMIN — DEXAMETHASONE 6 MG: 4 TABLET ORAL at 09:11

## 2020-11-08 RX ADMIN — BENZONATATE 100 MG: 100 CAPSULE ORAL at 09:11

## 2020-11-08 RX ADMIN — IPRATROPIUM BROMIDE AND ALBUTEROL SULFATE 3 ML: .5; 2.5 SOLUTION RESPIRATORY (INHALATION) at 02:11

## 2020-11-08 RX ADMIN — BENZONATATE 100 MG: 100 CAPSULE ORAL at 06:11

## 2020-11-08 NOTE — PLAN OF CARE
Problem: Adult Inpatient Plan of Care  Goal: Plan of Care Review  Outcome: Ongoing, Progressing  Goal: Absence of Hospital-Acquired Illness or Injury  Outcome: Ongoing, Progressing     Problem: Infection  Goal: Infection Symptom Resolution  Outcome: Ongoing, Progressing

## 2020-11-08 NOTE — PROGRESS NOTES
Hospital Medicine  Progress Note  [facility]      Patient Name: Robbie Connell  MRN:  [id number]  [facility] [place] 10 Linda Rain MD  Date of Admission:  11/5/2020     Length of Stay:  LOS: 3 days       This service was provided through telemedicine.  Patient was transferred to the telemedicine service on:  11/07/2020   The patient consents to virtual visits.   The patient location is: [facility]  Chief complaint:  Suspected COVID-19 virus infection   Start time: 1058a    End time:  1109a    Total time spent with patient: 11 min  Present with the patient at the time of the telemed/virtual assessment: n/a  I have assessed these findings virtually using a telemed platform and with assistance of bedside nurse.  The attending portion of this evaluation, treatment, and documentation was performed per Linda Rain MD via audiovisual modality.        Principal Problem:  Suspected COVID-19 virus infection      HPI: Patient is a 29 yo male with no significant past medical history coming in after feeling worse for the past couple of days and noticing his home o2 monitor was reading 89%. He was recently admitted and discharged from this facility for COVID symptoms. He states he was not on oxygen during that hospital stay but was sent home with the monitor and noted that his oxygen went down so he came to the hospital. He states that the oxygen placed in the ED, made him fell better. He was noted to be hypoxic to 85 in the ED, was placed on oxygen and medicine called for admission. He states compliance with azithromycin since leaving.     Hospital Course:  Patient admitted for evaluation of acute respiratory failure with hypoxia secondary to COVID-19.    Interval History:     Afebrile and currently requires 3L/min supplemental oxygen with pulse ox 90-93%  -he feels improved with decreased cough; wife is interested in pursuing convalescent plasma and patient is  appropriate due to hypoxia - encouraged to maintain activity and prone if able when in bed. Patient's wife at bedside.     Review of Systems:  ROS (Positive in Bold, otherwise negative)  Constitutional: fever, chills, night sweats, malaise, weakness  CV: chest pain, edema, palpitations  Resp: SOB, cough, sputum production  GI: changes in appetite, NVDC, pain, melena, hematochezia, GERD, hematemesis  : Dysuria, hematuria, urinary urgency, frequency  MSK: arthralgia/myalgia, joint swelling  Neuro/Psych: anxiety, depression    Inpatient Medications:    Current Facility-Administered Medications:     0.9%  NaCl infusion (for blood administration), , Intravenous, Q24H PRN, Linda Rain MD    acetaminophen tablet 1,000 mg, 1,000 mg, Oral, Q8H PRN, Shankar Diego MD, 1,000 mg at 11/07/20 0605    albuterol-ipratropium 2.5 mg-0.5 mg/3 mL nebulizer solution 3 mL, 3 mL, Nebulization, Q6H WAKE, Linsey Rouse MD, 3 mL at 11/08/20 1402    ascorbic acid (vitamin C) tablet 500 mg, 500 mg, Oral, BID, Shankar Diego MD, 500 mg at 11/08/20 0911    benzonatate capsule 100 mg, 100 mg, Oral, TID, Linsey Rouse MD, 100 mg at 11/08/20 0910    cefTRIAXone injection 1 g, 1 g, Intravenous, Q24H, Linsey Rouse MD, 1 g at 11/08/20 1226    dexAMETHasone tablet 6 mg, 6 mg, Oral, Daily, Shankar Diego MD, 6 mg at 11/08/20 0910    dextromethorphan-guaifenesin  mg/5 ml liquid 10 mL, 10 mL, Oral, Q4H PRN, Linda Rain MD    dextrose 50% injection 12.5 g, 12.5 g, Intravenous, PRN, Shankar Diego MD    dextrose 50% injection 25 g, 25 g, Intravenous, PRN, Shankar Diego MD    enoxaparin injection 40 mg, 40 mg, Subcutaneous, BID, Shankar Diego MD, 40 mg at 11/08/20 0909    glucagon (human recombinant) injection 1 mg, 1 mg, Intramuscular, PRN, Shankar Diego MD    glucose chewable tablet 16 g, 16 g, Oral, PRN, Shankar Diego MD    glucose chewable tablet 24 g, 24 g, Oral, PRNShankar MD    hydrALAZINE tablet 25 mg, 25 mg, Oral,  "Q8H PRN, Shankar Diego MD    hydrOXYzine pamoate capsule 25 mg, 25 mg, Oral, Q8H PRN, Linsey Rouse MD, 25 mg at 11/07/20 0925    insulin aspart U-100 pen 0-5 Units, 0-5 Units, Subcutaneous, QID (AC + HS) PRN, Shankar Diego MD    melatonin tablet 6 mg, 6 mg, Oral, Nightly PRN, Shankar Diego MD, 6 mg at 11/06/20 2210    multivitamin tablet, 1 tablet, Oral, Daily, Shankar Diego MD, 1 tablet at 11/08/20 0911    ondansetron injection 4 mg, 4 mg, Intravenous, Q8H PRN, Shankar Diego MD    remdesivir (EUA) 100 mg in sodium chloride 0.9% 250 mL infusion, 100 mg, Intravenous, Q24H, Linsey Rouse MD, Last Rate: 250 mL/hr at 11/08/20 1522, 100 mg at 11/08/20 1522    sodium chloride 0.9% flush 10 mL, 10 mL, Intravenous, PRN, Shankar Diego MD    vitamin D 1000 units tablet 1,000 Units, 1,000 Units, Oral, Daily, Shankar Diego MD, 1,000 Units at 11/08/20 0910      Physical Exam:      Intake/Output Summary (Last 24 hours) at 11/8/2020 1641  Last data filed at 11/8/2020 0745  Gross per 24 hour   Intake 1080 ml   Output --   Net 1080 ml     Wt Readings from Last 3 Encounters:   11/06/20 122.4 kg (269 lb 13.5 oz)   11/03/20 122.5 kg (270 lb 1 oz)   11/02/20 122.5 kg (270 lb)       /78 (BP Location: Right arm, Patient Position: Lying)   Pulse 78   Temp 98 °F (36.7 °C) (Oral)   Resp 20   Ht 5' 6" (1.676 m)   Wt 122.4 kg (269 lb 13.5 oz)   SpO2 (!) 91%   BMI 43.55 kg/m²     Physical Exam  Vitals signs and nursing note reviewed.   Constitutional:       General: He is not in acute distress.     Appearance: He is ill-appearing.   HENT:      Head: Normocephalic and atraumatic.      Right Ear: Hearing normal.      Left Ear: Hearing normal.      Nose: Nose normal.   Eyes:      General: No scleral icterus.        Right eye: No discharge.         Left eye: No discharge.      Extraocular Movements: Extraocular movements intact.   Cardiovascular:      Rate and Rhythm: Normal rate.   Pulmonary:      Effort: Pulmonary effort is " normal. No accessory muscle usage or respiratory distress.   Skin:     Findings: No rash.   Neurological:      General: No focal deficit present.      Mental Status: He is alert and oriented to person, place, and time.      Cranial Nerves: No cranial nerve deficit.      Motor: Weakness (generalized) present.   Psychiatric:         Mood and Affect: Mood normal.         Behavior: Behavior normal.         Thought Content: Thought content normal.         Judgment: Judgment normal.           Laboratory:  Lab Results   Component Value Date    [id number] Positive (A) 11/05/2020       Recent Labs   Lab 11/06/20 0340 11/07/20 0423 11/08/20  0248   WBC 7.13 8.91 9.94   LYMPH 8.7*  0.6* 11.3*  1.0 11.6*  1.2   HGB 14.3 14.4 13.7*   HCT 43.2 43.8 42.4   * 177 208     Recent Labs   Lab 11/06/20 0340 11/07/20 0423 11/08/20  0248    139 139   K 4.1 4.9 4.4    102 104   CO2 23 23 24   BUN 11 15 18   CREATININE 0.8 0.8 0.8   * 127* 145*   CALCIUM 9.0 9.1 8.9   MG 2.1 2.2 2.1   PHOS 3.8 4.6* 3.5     Recent Labs   Lab 11/06/20 0340 11/07/20 0423 11/08/20  0248   ALKPHOS 85 78 76   * 91* 80*   AST 54* 38 32   ALBUMIN 3.6 3.4* 3.2*   PROT 7.5 7.3 6.9   BILITOT 0.6 0.4 0.3        Recent Labs     11/05/20  2231 11/07/20  0100   FERRITIN 527*  --    .4* 57.3*   LDH 1,378*  --    TROPONINI 0.007  --        All labs within the last 24 hours were reviewed.     Microbiology:  Microbiology Results (last 7 days)     Procedure Component Value Units Date/Time    Blood culture (site 1) [[id number]]     Order Status: Canceled Specimen: Blood     Blood culture (site 2) [[id number]]     Order Status: Canceled Specimen: Blood             Imaging  ECG Results          EKG 12-lead (Final result)  Result time 11/06/20 13:38:44    Final result by Interface, Lab In [facility] (11/06/20 13:38:44)                 Narrative:    Test Reason : Z20.828,    Vent. Rate : 098 BPM     Atrial Rate : 098 BPM     P-R Int :  130 ms          QRS Dur : 084 ms      QT Int : 314 ms       P-R-T Axes : 059 018 027 degrees     QTc Int : 400 ms    Normal sinus rhythm  Normal ECG  When compared with ECG of 02-NOV-2020 10:48,  No significant change was found  Confirmed by CRYSTAL MONTANA MD ([id number]) on 11/6/2020 1:38:30 PM    Referred By: KANDIS   SELF           Confirmed By:CRYSTAL MONTANA MD                              No results found for this or any previous visit.    X-Ray Chest PA And Lateral  Narrative: EXAMINATION:  XR CHEST PA AND LATERAL    CLINICAL HISTORY:  Suspected Covid-19 Virus Infection;    TECHNIQUE:  PA and lateral views of the chest were performed.    COMPARISON:  11/02/2020.    FINDINGS:  Interval increase in bilateral infiltrates.    Cardiomediastinal silhouette is unchanged.    Regional osseous structures are unchanged.  Impression: Interval increase in bilateral infiltrates.    Electronically signed by: Samir Samano MD  Date:    11/05/2020  Time:    23:01      All imaging within the last 24 hours was reviewed.     Assessment and Plan:    Active Hospital Problems    Diagnosis  POA    *Suspected COVID-19 virus infection [Z20.828]  Yes    Anxiety and depression [F41.9, F32.9]  Yes    Acute respiratory failure with hypoxia [J96.01]  Yes    Fever [R50.9]  Yes    Cough [R05]  Yes    Labile blood pressure [R09.89]  Yes    Family history of diabetes mellitus [Z83.3]  Not Applicable      Resolved Hospital Problems   No resolved problems to display.       COVID-19 Virus Infection:  Viral Pneumonia due to COVID-19:  -Pt tested for COVID-19 and noted to be positive   -Isolation: Airborne/Droplet. Surgical mask on patient. Notify Infection Control  -[facility]  -Monitoring: Telemetry & Continuous Pulse Oximetry  -Antibiotics: started on ceftriaxone 1g Q24h x 5 days and azithromycin 500mg po x1, then 250mg po daily x 4 days  -Nutrition:    -Multivitamin PO daily   -Add Boost  supplement   -Vitamin D 1000IU daily if deficient   -Ascorbic acid 500mg PO bid  -Supportive Care:   -Acetaminophen 650mg PO Q6hr PRN fever/headache   -Loperamide PRN viral diarrhea   -Robitussin, tessalon perls for cough   -IVF if indicated, restrictive strategy preferred, no maintenance IV if able   -Investigational Therapies:  -Atorvastatin 40mg po daily   -Due to hypoxia, pt started on dexamethasone 6mg PO daily up to 10 days or until pt is discharged from hospital (whichever is sooner)   -Pt meets criteria for remdesivir. Pt provided verbal consent to start this medication; currently on D#2   --Pt meets criteria for convalescent plasma. Pt provided verbal consent to start this medication; ordered on 11/8    Acute Hypoxemic Respiratory Failure:  -currently requiring 3L by nasal cannula; wean as tolerated  -RT consult via Respiratory Communication for COVID Protocols  -If wheezing, albuterol INH Q6h scheduled & PRN  -Incentive Spirometer Q4h  -Flutter Valve Q4h  -Continuous pulse oximetry; titrate oxygen to keep sats between 92-96%  -Wean off O2 as tolerated    -Supplemental O2 via LFNC, VentiMask, or HFNC (see Respiratory Support Oxygen Therapies)  -Proning Protocol if patient is a candidate with GCS >13 and able to self-prone (see HM Proning Protocol)  -If deterioration, may warrant trial of NIPPV and transfer to neg pressure room or immediate ICU consult    Anxiety/depression:  -patient's mother recently passed away due to COVID.  His father was also sick with COVID and intubated in the ICU for long time, but is doing better now.  -patient is a lot of anxiety/and admitted to having panic attacks related to COVID  -Vistaril 25 mg q.8 hours p.r.n. available for now  -will need outpatient PCP/psych follow-up for chronic management of anxiety/depression    Goals of care, counseling/discussion  -Reviewed the typical clinical course of COVID19 with pt, including the potential for acute decompensation requiring  intubation and mechanical ventilation  -Discussed again as part of routine daily evaluation, patient/POA maintains code status of full    VTE High Risk Prophylaxis: enoxaparin 40mg sq QHS @ 2100 (bundled care) if GFR >30    Discharge Planning   FAIZA: 11/10/2020     Code Status: Full Code   Is the patient medically ready for discharge?:     Reason for patient still in hospital (select all that apply): Patient trending condition  Discharge Plan A: Home   Discharge Delays: None known at this time        Linda Rain MD  Hospital Medicine [facility] [phone]

## 2020-11-08 NOTE — PLAN OF CARE
[facility] Convalescent Plasma Emergency Use Authorization Fact Sheet was provided to the patient and/or caregiver.  The patient and/or caregiver has been counseled that the FDA has authorized the emergency use of convalescent plasma which is not FDA approved. The significant known and potential risks and benefits of COVID-19 convalescent plasma and the extent to which such risks and benefits are unknown have been discussed with the patient and/or caregiver. The patient and/or caregiver has been given the option to accept or refuse and has verbally agreed to receive convalescent plasma. All questions and concerns were addressed.    Linda Rain MD  [facility] Medicine  [facility] [place]  [facility] [phone]

## 2020-11-09 LAB
ALBUMIN SERPL BCP-MCNC: 3.6 G/DL (ref 3.5–5.2)
ALP SERPL-CCNC: 81 U/L (ref 55–135)
ALT SERPL W/O P-5'-P-CCNC: 325 U/L (ref 10–44)
ANION GAP SERPL CALC-SCNC: 12 MMOL/L (ref 8–16)
AST SERPL-CCNC: 156 U/L (ref 10–40)
BASOPHILS # BLD AUTO: 0.04 K/UL (ref 0–0.2)
BASOPHILS NFR BLD: 0.4 % (ref 0–1.9)
BILIRUB SERPL-MCNC: 0.5 MG/DL (ref 0.1–1)
BUN SERPL-MCNC: 17 MG/DL (ref 6–20)
CALCIUM SERPL-MCNC: 9 MG/DL (ref 8.7–10.5)
CHLORIDE SERPL-SCNC: 103 MMOL/L (ref 95–110)
CO2 SERPL-SCNC: 25 MMOL/L (ref 23–29)
CREAT SERPL-MCNC: 0.8 MG/DL (ref 0.5–1.4)
CRP SERPL-MCNC: 5.9 MG/L (ref 0–8.2)
DIFFERENTIAL METHOD: ABNORMAL
EOSINOPHIL # BLD AUTO: 0 K/UL (ref 0–0.5)
EOSINOPHIL NFR BLD: 0 % (ref 0–8)
ERYTHROCYTE [DISTWIDTH] IN BLOOD BY AUTOMATED COUNT: 12.7 % (ref 11.5–14.5)
EST. GFR  (AFRICAN AMERICAN): >60 ML/MIN/1.73 M^2
EST. GFR  (NON AFRICAN AMERICAN): >60 ML/MIN/1.73 M^2
GLUCOSE SERPL-MCNC: 154 MG/DL (ref 70–110)
HCT VFR BLD AUTO: 43.6 % (ref 40–54)
HGB BLD-MCNC: 14.3 G/DL (ref 14–18)
IMM GRANULOCYTES # BLD AUTO: 0.4 K/UL (ref 0–0.04)
IMM GRANULOCYTES NFR BLD AUTO: 4.1 % (ref 0–0.5)
LYMPHOCYTES # BLD AUTO: 1.4 K/UL (ref 1–4.8)
LYMPHOCYTES NFR BLD: 14.2 % (ref 18–48)
MAGNESIUM SERPL-MCNC: 2.2 MG/DL (ref 1.6–2.6)
MCH RBC QN AUTO: 29.5 PG (ref 27–31)
MCHC RBC AUTO-ENTMCNC: 32.8 G/DL (ref 32–36)
MCV RBC AUTO: 90 FL (ref 82–98)
MONOCYTES # BLD AUTO: 1 K/UL (ref 0.3–1)
MONOCYTES NFR BLD: 9.9 % (ref 4–15)
NEUTROPHILS # BLD AUTO: 6.9 K/UL (ref 1.8–7.7)
NEUTROPHILS NFR BLD: 71.4 % (ref 38–73)
NRBC BLD-RTO: 0 /100 WBC
PHOSPHATE SERPL-MCNC: 3.2 MG/DL (ref 2.7–4.5)
PLATELET # BLD AUTO: 233 K/UL (ref 150–350)
PMV BLD AUTO: 12.1 FL (ref 9.2–12.9)
POCT GLUCOSE: 131 MG/DL (ref 70–110)
POCT GLUCOSE: 135 MG/DL (ref 70–110)
POCT GLUCOSE: 136 MG/DL (ref 70–110)
POCT GLUCOSE: 219 MG/DL (ref 70–110)
POTASSIUM SERPL-SCNC: 4.3 MMOL/L (ref 3.5–5.1)
PROT SERPL-MCNC: 7.4 G/DL (ref 6–8.4)
RBC # BLD AUTO: 4.84 M/UL (ref 4.6–6.2)
SODIUM SERPL-SCNC: 140 MMOL/L (ref 136–145)
WBC # BLD AUTO: 9.66 K/UL (ref 3.9–12.7)

## 2020-11-09 PROCEDURE — 25000242 PHARM REV CODE 250 ALT 637 W/ HCPCS: Performed by: HOSPITALIST

## 2020-11-09 PROCEDURE — 99232 SBSQ HOSP IP/OBS MODERATE 35: CPT | Mod: ,,, | Performed by: INTERNAL MEDICINE

## 2020-11-09 PROCEDURE — 94761 N-INVAS EAR/PLS OXIMETRY MLT: CPT

## 2020-11-09 PROCEDURE — 94664 DEMO&/EVAL PT USE INHALER: CPT

## 2020-11-09 PROCEDURE — 99900035 HC TECH TIME PER 15 MIN (STAT)

## 2020-11-09 PROCEDURE — 25000003 PHARM REV CODE 250: Performed by: HOSPITALIST

## 2020-11-09 PROCEDURE — 63600175 PHARM REV CODE 636 W HCPCS: Performed by: INTERNAL MEDICINE

## 2020-11-09 PROCEDURE — 99232 PR SUBSEQUENT HOSPITAL CARE,LEVL II: ICD-10-PCS | Mod: ,,, | Performed by: INTERNAL MEDICINE

## 2020-11-09 PROCEDURE — 36415 COLL VENOUS BLD VENIPUNCTURE: CPT

## 2020-11-09 PROCEDURE — 84100 ASSAY OF PHOSPHORUS: CPT

## 2020-11-09 PROCEDURE — 83735 ASSAY OF MAGNESIUM: CPT

## 2020-11-09 PROCEDURE — 25000003 PHARM REV CODE 250: Performed by: INTERNAL MEDICINE

## 2020-11-09 PROCEDURE — 63600175 PHARM REV CODE 636 W HCPCS: Performed by: HOSPITALIST

## 2020-11-09 PROCEDURE — 27000221 HC OXYGEN, UP TO 24 HOURS

## 2020-11-09 PROCEDURE — 80053 COMPREHEN METABOLIC PANEL: CPT

## 2020-11-09 PROCEDURE — 94799 UNLISTED PULMONARY SVC/PX: CPT

## 2020-11-09 PROCEDURE — 86140 C-REACTIVE PROTEIN: CPT

## 2020-11-09 PROCEDURE — 94640 AIRWAY INHALATION TREATMENT: CPT

## 2020-11-09 PROCEDURE — 20600001 HC STEP DOWN PRIVATE ROOM

## 2020-11-09 PROCEDURE — 85025 COMPLETE CBC W/AUTO DIFF WBC: CPT

## 2020-11-09 RX ADMIN — REMDESIVIR 100 MG: 100 INJECTION, POWDER, LYOPHILIZED, FOR SOLUTION INTRAVENOUS at 03:11

## 2020-11-09 RX ADMIN — ENOXAPARIN SODIUM 40 MG: 40 INJECTION SUBCUTANEOUS at 08:11

## 2020-11-09 RX ADMIN — GUAIFENESIN AND DEXTROMETHORPHAN 10 ML: 100; 10 SYRUP ORAL at 02:11

## 2020-11-09 RX ADMIN — BENZONATATE 100 MG: 100 CAPSULE ORAL at 03:11

## 2020-11-09 RX ADMIN — OXYCODONE HYDROCHLORIDE AND ACETAMINOPHEN 500 MG: 500 TABLET ORAL at 08:11

## 2020-11-09 RX ADMIN — DEXAMETHASONE 6 MG: 4 TABLET ORAL at 08:11

## 2020-11-09 RX ADMIN — IPRATROPIUM BROMIDE AND ALBUTEROL SULFATE 3 ML: .5; 2.5 SOLUTION RESPIRATORY (INHALATION) at 08:11

## 2020-11-09 RX ADMIN — BENZONATATE 100 MG: 100 CAPSULE ORAL at 08:11

## 2020-11-09 RX ADMIN — THERA TABS 1 TABLET: TAB at 08:11

## 2020-11-09 RX ADMIN — CHOLECALCIFEROL TAB 25 MCG (1000 UNIT) 1000 UNITS: 25 TAB at 08:11

## 2020-11-09 RX ADMIN — HYDROXYZINE PAMOATE 25 MG: 25 CAPSULE ORAL at 08:11

## 2020-11-09 RX ADMIN — CEFTRIAXONE SODIUM 1 G: 1 INJECTION, POWDER, FOR SOLUTION INTRAMUSCULAR; INTRAVENOUS at 01:11

## 2020-11-09 RX ADMIN — IPRATROPIUM BROMIDE AND ALBUTEROL SULFATE 3 ML: .5; 2.5 SOLUTION RESPIRATORY (INHALATION) at 02:11

## 2020-11-09 NOTE — NURSING
Unit of FFP started. Patient given instruction and education of reaction, patient verbalized understanding. Remained with patient for 15min. No reaction noted.

## 2020-11-09 NOTE — PLAN OF CARE
Problem: Adult Inpatient Plan of Care  Goal: Plan of Care Review  Outcome: Ongoing, Progressing  Goal: Optimal Comfort and Wellbeing  Outcome: Ongoing, Progressing     Problem: Infection  Goal: Infection Symptom Resolution  Outcome: Ongoing, Progressing

## 2020-11-10 ENCOUNTER — RESEARCH ENCOUNTER (OUTPATIENT)
Dept: RESEARCH | Facility: HOSPITAL | Age: 30
End: 2020-11-10

## 2020-11-10 VITALS
TEMPERATURE: 98 F | HEART RATE: 70 BPM | BODY MASS INDEX: 43.36 KG/M2 | WEIGHT: 269.81 LBS | HEIGHT: 66 IN | SYSTOLIC BLOOD PRESSURE: 141 MMHG | RESPIRATION RATE: 20 BRPM | DIASTOLIC BLOOD PRESSURE: 74 MMHG | OXYGEN SATURATION: 95 %

## 2020-11-10 LAB
ALBUMIN SERPL BCP-MCNC: 3.5 G/DL (ref 3.5–5.2)
ALP SERPL-CCNC: 76 U/L (ref 55–135)
ALT SERPL W/O P-5'-P-CCNC: 256 U/L (ref 10–44)
ANION GAP SERPL CALC-SCNC: 9 MMOL/L (ref 8–16)
AST SERPL-CCNC: 48 U/L (ref 10–40)
BASOPHILS # BLD AUTO: 0.04 K/UL (ref 0–0.2)
BASOPHILS NFR BLD: 0.4 % (ref 0–1.9)
BILIRUB SERPL-MCNC: 0.6 MG/DL (ref 0.1–1)
BUN SERPL-MCNC: 16 MG/DL (ref 6–20)
CALCIUM SERPL-MCNC: 8.9 MG/DL (ref 8.7–10.5)
CHLORIDE SERPL-SCNC: 102 MMOL/L (ref 95–110)
CO2 SERPL-SCNC: 24 MMOL/L (ref 23–29)
CREAT SERPL-MCNC: 0.7 MG/DL (ref 0.5–1.4)
DIFFERENTIAL METHOD: ABNORMAL
EOSINOPHIL # BLD AUTO: 0 K/UL (ref 0–0.5)
EOSINOPHIL NFR BLD: 0 % (ref 0–8)
ERYTHROCYTE [DISTWIDTH] IN BLOOD BY AUTOMATED COUNT: 12.8 % (ref 11.5–14.5)
EST. GFR  (AFRICAN AMERICAN): >60 ML/MIN/1.73 M^2
EST. GFR  (NON AFRICAN AMERICAN): >60 ML/MIN/1.73 M^2
GLUCOSE SERPL-MCNC: 119 MG/DL (ref 70–110)
HAV IGM SERPL QL IA: NEGATIVE
HBV CORE IGM SERPL QL IA: NEGATIVE
HBV SURFACE AG SERPL QL IA: NEGATIVE
HCT VFR BLD AUTO: 41.8 % (ref 40–54)
HCV AB SERPL QL IA: NEGATIVE
HGB BLD-MCNC: 14.1 G/DL (ref 14–18)
IMM GRANULOCYTES # BLD AUTO: 0.45 K/UL (ref 0–0.04)
IMM GRANULOCYTES NFR BLD AUTO: 4.1 % (ref 0–0.5)
LYMPHOCYTES # BLD AUTO: 1.9 K/UL (ref 1–4.8)
LYMPHOCYTES NFR BLD: 17.1 % (ref 18–48)
MAGNESIUM SERPL-MCNC: 2.2 MG/DL (ref 1.6–2.6)
MCH RBC QN AUTO: 29.9 PG (ref 27–31)
MCHC RBC AUTO-ENTMCNC: 33.7 G/DL (ref 32–36)
MCV RBC AUTO: 89 FL (ref 82–98)
MONOCYTES # BLD AUTO: 1.2 K/UL (ref 0.3–1)
MONOCYTES NFR BLD: 11 % (ref 4–15)
NEUTROPHILS # BLD AUTO: 7.4 K/UL (ref 1.8–7.7)
NEUTROPHILS NFR BLD: 67.4 % (ref 38–73)
NRBC BLD-RTO: 0 /100 WBC
PHOSPHATE SERPL-MCNC: 3.5 MG/DL (ref 2.7–4.5)
PLATELET # BLD AUTO: 223 K/UL (ref 150–350)
PMV BLD AUTO: 11.9 FL (ref 9.2–12.9)
POCT GLUCOSE: 110 MG/DL (ref 70–110)
POCT GLUCOSE: 111 MG/DL (ref 70–110)
POCT GLUCOSE: 137 MG/DL (ref 70–110)
POCT GLUCOSE: 239 MG/DL (ref 70–110)
POTASSIUM SERPL-SCNC: 4.2 MMOL/L (ref 3.5–5.1)
PROT SERPL-MCNC: 7 G/DL (ref 6–8.4)
RBC # BLD AUTO: 4.72 M/UL (ref 4.6–6.2)
SODIUM SERPL-SCNC: 135 MMOL/L (ref 136–145)
WBC # BLD AUTO: 10.9 K/UL (ref 3.9–12.7)

## 2020-11-10 PROCEDURE — 80074 ACUTE HEPATITIS PANEL: CPT

## 2020-11-10 PROCEDURE — 25000003 PHARM REV CODE 250: Performed by: HOSPITALIST

## 2020-11-10 PROCEDURE — 63600175 PHARM REV CODE 636 W HCPCS: Performed by: INTERNAL MEDICINE

## 2020-11-10 PROCEDURE — 99239 PR HOSPITAL DISCHARGE DAY,>30 MIN: ICD-10-PCS | Mod: ,,, | Performed by: INTERNAL MEDICINE

## 2020-11-10 PROCEDURE — 85025 COMPLETE CBC W/AUTO DIFF WBC: CPT

## 2020-11-10 PROCEDURE — 25000003 PHARM REV CODE 250: Performed by: INTERNAL MEDICINE

## 2020-11-10 PROCEDURE — 80053 COMPREHEN METABOLIC PANEL: CPT

## 2020-11-10 PROCEDURE — 25000242 PHARM REV CODE 250 ALT 637 W/ HCPCS: Performed by: HOSPITALIST

## 2020-11-10 PROCEDURE — 94664 DEMO&/EVAL PT USE INHALER: CPT

## 2020-11-10 PROCEDURE — 94640 AIRWAY INHALATION TREATMENT: CPT

## 2020-11-10 PROCEDURE — 36415 COLL VENOUS BLD VENIPUNCTURE: CPT

## 2020-11-10 PROCEDURE — 83735 ASSAY OF MAGNESIUM: CPT

## 2020-11-10 PROCEDURE — 94761 N-INVAS EAR/PLS OXIMETRY MLT: CPT

## 2020-11-10 PROCEDURE — 99900035 HC TECH TIME PER 15 MIN (STAT)

## 2020-11-10 PROCEDURE — 99239 HOSP IP/OBS DSCHRG MGMT >30: CPT | Mod: ,,, | Performed by: INTERNAL MEDICINE

## 2020-11-10 PROCEDURE — 84100 ASSAY OF PHOSPHORUS: CPT

## 2020-11-10 RX ORDER — BENZONATATE 100 MG/1
100 CAPSULE ORAL 3 TIMES DAILY
Qty: 30 CAPSULE | Refills: 1 | Status: SHIPPED | OUTPATIENT
Start: 2020-11-10 | End: 2020-11-20

## 2020-11-10 RX ORDER — HYDROXYZINE PAMOATE 25 MG/1
25 CAPSULE ORAL EVERY 8 HOURS PRN
Qty: 30 CAPSULE | Refills: 0 | Status: SHIPPED | OUTPATIENT
Start: 2020-11-10

## 2020-11-10 RX ORDER — GUAIFENESIN/DEXTROMETHORPHAN 100-10MG/5
10 SYRUP ORAL EVERY 4 HOURS PRN
Refills: 0 | COMMUNITY
Start: 2020-11-10 | End: 2020-11-20

## 2020-11-10 RX ORDER — ALBUTEROL SULFATE 2.5 MG/.5ML
2.5 SOLUTION RESPIRATORY (INHALATION) 3 TIMES DAILY
Qty: 90 EACH | Refills: 11 | Status: SHIPPED | OUTPATIENT
Start: 2020-11-10 | End: 2021-11-10

## 2020-11-10 RX ADMIN — IPRATROPIUM BROMIDE AND ALBUTEROL SULFATE 3 ML: .5; 2.5 SOLUTION RESPIRATORY (INHALATION) at 02:11

## 2020-11-10 RX ADMIN — DEXAMETHASONE 6 MG: 4 TABLET ORAL at 09:11

## 2020-11-10 RX ADMIN — BENZONATATE 100 MG: 100 CAPSULE ORAL at 09:11

## 2020-11-10 RX ADMIN — OXYCODONE HYDROCHLORIDE AND ACETAMINOPHEN 500 MG: 500 TABLET ORAL at 09:11

## 2020-11-10 RX ADMIN — ENOXAPARIN SODIUM 40 MG: 40 INJECTION SUBCUTANEOUS at 09:11

## 2020-11-10 RX ADMIN — IPRATROPIUM BROMIDE AND ALBUTEROL SULFATE 3 ML: .5; 2.5 SOLUTION RESPIRATORY (INHALATION) at 07:11

## 2020-11-10 RX ADMIN — THERA TABS 1 TABLET: TAB at 09:11

## 2020-11-10 RX ADMIN — CHOLECALCIFEROL TAB 25 MCG (1000 UNIT) 1000 UNITS: 25 TAB at 09:11

## 2020-11-10 RX ADMIN — REMDESIVIR 100 MG: 100 INJECTION, POWDER, LYOPHILIZED, FOR SOLUTION INTRAVENOUS at 04:11

## 2020-11-10 RX ADMIN — CEFTRIAXONE SODIUM 1 G: 1 INJECTION, POWDER, FOR SOLUTION INTRAMUSCULAR; INTRAVENOUS at 12:11

## 2020-11-10 RX ADMIN — BENZONATATE 100 MG: 100 CAPSULE ORAL at 03:11

## 2020-11-10 NOTE — PLAN OF CARE
Problem: Adult Inpatient Plan of Care  Goal: Plan of Care Review  Outcome: Ongoing, Progressing  Goal: Optimal Comfort and Wellbeing  Outcome: Ongoing, Progressing     Problem: Bariatric Environmental Safety  Goal: Safety Maintained with Care  Outcome: Ongoing, Progressing     Problem: Infection  Goal: Infection Symptom Resolution  Outcome: Ongoing, Progressing

## 2020-11-10 NOTE — PROGRESS NOTES
The person granting permission, Robbie Connell, was called today regarding participation in (IRB #[id number] PI: Rosa).   The Verbal Informed Consent was read and discussed by the consenter. The following was discussed:   Types of specimens to be collected   All medical information released to researchers will be stripped of identifiers and no patient information will be given to anyone outside of this research project.    Participating in a research study is not the same as getting regular medical care and will not improve the patient's health. The purpose of a research study is to gather information.  Being in this study does not interfere with your regular medical care.   The patient does not have to participate in this study. If they do not join, their care at [facility] will not be affected.  The person granting permission was provided adequate time to ask questions regarding the scope and purpose of the study.  Permission was obtained by telephone.   The above statements were read by the person obtaining permission to the person granting permission and witnessed by Luisa BLANCA, who also confirmed the patient's consent to the study. The witness information was documented on the verbal consent form as well.  This Verbal Informed Consent process was conducted prior to initiation of any study procedures.

## 2020-11-10 NOTE — NURSING
Home Oxygen Evaluation    Date Performed: 11/10/2020    1) Patient's Home O2 Sat on room air, while at rest: 97%        If O2 sats on room air at rest are 88% or below, patient qualifies. No additional testing needed. Document N/A in steps 2 and 3. If 89% or above, complete steps 2.      2) Patient's O2 Sat on room air while exercisin%        If O2 sats on room air while exercising remain 89% or above patient does not qualify, no further testing needed Document N/A in step 3. If O2 sats on room air while exercising are 88% or below, continue to step 3.      3) Patient's O2 Sat while exercising on O2: 96% at 2 LPM         (Must show improvement from #2 for patients to qualify)    If O2 sats improve on oxygen, patient qualifies for portable oxygen. If not, the patient does not qualify.

## 2020-11-10 NOTE — PROGRESS NOTES
Hospital Medicine  Progress Note  [facility]      Patient Name: Robbie Connell  MRN:  [id number]  [facility] [place] Team: [facility] [place] 10 Linda Rain MD  Date of Admission:  11/5/2020     Length of Stay:  LOS: 4 days       This service was provided through telemedicine.  Patient was transferred to the telemedicine service on:  11/07/2020   The patient consents to virtual visits.   The patient location is: [facility]  Chief complaint:  Suspected COVID-19 virus infection   Start time: 1200p    End time:  1210p    Total time spent with patient: 10 min  Present with the patient at the time of the telemed/virtual assessment: n/a  I have assessed these findings virtually using a telemed platform and with assistance of bedside nurse.  The attending portion of this evaluation, treatment, and documentation was performed per Linda Rain MD via audiovisual modality.        Principal Problem:  Suspected COVID-19 virus infection      HPI: Patient is a 29 yo male with no significant past medical history coming in after feeling worse for the past couple of days and noticing his home o2 monitor was reading 89%. He was recently admitted and discharged from this facility for COVID symptoms. He states he was not on oxygen during that hospital stay but was sent home with the monitor and noted that his oxygen went down so he came to the hospital. He states that the oxygen placed in the ED, made him fell better. He was noted to be hypoxic to 85 in the ED, was placed on oxygen and medicine called for admission. He states compliance with azithromycin since leaving.     Hospital Course:  Patient admitted for evaluation of acute respiratory failure with hypoxia secondary to COVID-19.    Interval History:     Afebrile and currently off supplemental oxygen with pulse ox 94-95%.  -he continues to improve daily and he feels his energy is improving; dyspnea/cough aslo improved; reminded about hydroxyzine  for anxiety     Review of Systems:  ROS (Positive in Bold, otherwise negative)  Constitutional: fever, chills, night sweats, malaise, weakness  CV: chest pain, edema, palpitations  Resp: SOB, cough, sputum production  GI: changes in appetite, NVDC, pain, melena, hematochezia, GERD, hematemesis  : Dysuria, hematuria, urinary urgency, frequency  MSK: arthralgia/myalgia, joint swelling  Neuro/Psych: anxiety, depression    Inpatient Medications:    Current Facility-Administered Medications:     0.9%  NaCl infusion (for blood administration), , Intravenous, Q24H PRN, Linda Rain MD, Last Rate: 150 mL/hr at 11/08/20 2155    acetaminophen tablet 1,000 mg, 1,000 mg, Oral, Q8H PRN, Shankar Diego MD, 1,000 mg at 11/07/20 0605    albuterol-ipratropium 2.5 mg-0.5 mg/3 mL nebulizer solution 3 mL, 3 mL, Nebulization, Q6H WAKE, Linsey Rouse MD, 3 mL at 11/09/20 1421    ascorbic acid (vitamin C) tablet 500 mg, 500 mg, Oral, BID, Shankar Diego MD, 500 mg at 11/09/20 0836    benzonatate capsule 100 mg, 100 mg, Oral, TID, Linsey Rouse MD, 100 mg at 11/09/20 1529    cefTRIAXone injection 1 g, 1 g, Intravenous, Q24H, Linda Rain MD, 1 g at 11/09/20 1313    dexAMETHasone tablet 6 mg, 6 mg, Oral, Daily, Shankar Diego MD, 6 mg at 11/09/20 0836    dextromethorphan-guaifenesin  mg/5 ml liquid 10 mL, 10 mL, Oral, Q4H PRN, Linda Rain MD, 10 mL at 11/09/20 0232    dextrose 50% injection 12.5 g, 12.5 g, Intravenous, PRN, Shankar Diego MD    dextrose 50% injection 25 g, 25 g, Intravenous, PRN, Shankar Diego MD    enoxaparin injection 40 mg, 40 mg, Subcutaneous, BID, Shankar Diego MD, 40 mg at 11/09/20 0836    glucagon (human recombinant) injection 1 mg, 1 mg, Intramuscular, PRN, Shankar Diego MD    glucose chewable tablet 16 g, 16 g, Oral, PRN, Shankar Diego MD    glucose chewable tablet 24 g, 24 g, Oral, PRN, Shankar Diego MD    hydrALAZINE tablet 25 mg, 25 mg, Oral, Q8H PRN, Shankar Diego MD    hydrOXYzine  "pamoate capsule 25 mg, 25 mg, Oral, Q8H PRN, Linsey Rouse MD, 25 mg at 11/07/20 0925    insulin aspart U-100 pen 0-5 Units, 0-5 Units, Subcutaneous, QID (AC + HS) PRN, Shankar Diego MD    melatonin tablet 6 mg, 6 mg, Oral, Nightly PRN, Shankar Diego MD, 6 mg at 11/06/20 2210    multivitamin tablet, 1 tablet, Oral, Daily, Shankar Diego MD, 1 tablet at 11/09/20 0836    ondansetron injection 4 mg, 4 mg, Intravenous, Q8H PRN, Shankar Diego MD    remdesivir (EUA) 100 mg in sodium chloride 0.9% 250 mL infusion, 100 mg, Intravenous, Q24H, Linsey Rouse MD, Last Rate: 250 mL/hr at 11/09/20 1529, 100 mg at 11/09/20 1529    sodium chloride 0.9% flush 10 mL, 10 mL, Intravenous, PRN, Shankar Diego MD    vitamin D 1000 units tablet 1,000 Units, 1,000 Units, Oral, Daily, Shankar Diego MD, 1,000 Units at 11/09/20 0836      Physical Exam:      Intake/Output Summary (Last 24 hours) at 11/9/2020 1835  Last data filed at 11/8/2020 2155  Gross per 24 hour   Intake 430 ml   Output --   Net 430 ml     Wt Readings from Last 3 Encounters:   11/06/20 122.4 kg (269 lb 13.5 oz)   11/03/20 122.5 kg (270 lb 1 oz)   11/02/20 122.5 kg (270 lb)       /71   Pulse 80   Temp 97.9 °F (36.6 °C) (Oral)   Resp 20   Ht 5' 6" (1.676 m)   Wt 122.4 kg (269 lb 13.5 oz)   SpO2 (!) 91%   BMI 43.55 kg/m²     Physical Exam  Vitals signs and nursing note reviewed.   Constitutional:       General: He is not in acute distress.     Appearance: He is ill-appearing.   HENT:      Head: Normocephalic and atraumatic.      Right Ear: Hearing normal.      Left Ear: Hearing normal.      Nose: Nose normal.   Eyes:      General: No scleral icterus.        Right eye: No discharge.         Left eye: No discharge.      Extraocular Movements: Extraocular movements intact.   Cardiovascular:      Rate and Rhythm: Normal rate.   Pulmonary:      Effort: Pulmonary effort is normal. No accessory muscle usage or respiratory distress.   Skin:     Findings: No rash. "   Neurological:      General: No focal deficit present.      Mental Status: He is alert and oriented to person, place, and time.      Cranial Nerves: No cranial nerve deficit.      Motor: Weakness (generalized) present.   Psychiatric:         Mood and Affect: Mood normal.         Behavior: Behavior normal.         Thought Content: Thought content normal.         Judgment: Judgment normal.           Laboratory:  Lab Results   Component Value Date    [id number] Positive (A) 11/05/2020       Recent Labs   Lab 11/07/20 0423 11/08/20 0248 11/09/20  0244   WBC 8.91 9.94 9.66   LYMPH 11.3*  1.0 11.6*  1.2 14.2*  1.4   HGB 14.4 13.7* 14.3   HCT 43.8 42.4 43.6    208 233     Recent Labs   Lab 11/07/20 0423 11/08/20 0248 11/09/20  0244    139 140   K 4.9 4.4 4.3    104 103   CO2 23 24 25   BUN 15 18 17   CREATININE 0.8 0.8 0.8   * 145* 154*   CALCIUM 9.1 8.9 9.0   MG 2.2 2.1 2.2   PHOS 4.6* 3.5 3.2     Recent Labs   Lab 11/07/20 0423 11/08/20 0248 11/09/20  0244   ALKPHOS 78 76 81   ALT 91* 80* 325*   AST 38 32 156*   ALBUMIN 3.4* 3.2* 3.6   PROT 7.3 6.9 7.4   BILITOT 0.4 0.3 0.5        Recent Labs     11/07/20  0100 11/09/20  0026   CRP 57.3* 5.9       All labs within the last 24 hours were reviewed.     Microbiology:  Microbiology Results (last 7 days)     Procedure Component Value Units Date/Time    Blood culture (site 1) [[id number]]     Order Status: Canceled Specimen: Blood     Blood culture (site 2) [[id number]]     Order Status: Canceled Specimen: Blood             Imaging  ECG Results          EKG 12-lead (Final result)  Result time 11/06/20 13:38:44    Final result by Interface, Lab In [facility] (11/06/20 13:38:44)                 Narrative:    Test Reason : Z20.828,    Vent. Rate : 098 BPM     Atrial Rate : 098 BPM     P-R Int : 130 ms          QRS Dur : 084 ms      QT Int : 314 ms       P-R-T Axes : 059 018 027 degrees     QTc Int : 400 ms    Normal sinus rhythm  Normal ECG  When  compared with ECG of 02-NOV-2020 10:48,  No significant change was found  Confirmed by CRYSTAL MONTANA MD ([id number]) on 11/6/2020 1:38:30 PM    Referred By: KANDIS   SELF           Confirmed By:CRYSTAL MONTANA MD                              No results found for this or any previous visit.    X-Ray Chest PA And Lateral  Narrative: EXAMINATION:  XR CHEST PA AND LATERAL    CLINICAL HISTORY:  Suspected Covid-19 Virus Infection;    TECHNIQUE:  PA and lateral views of the chest were performed.    COMPARISON:  11/02/2020.    FINDINGS:  Interval increase in bilateral infiltrates.    Cardiomediastinal silhouette is unchanged.    Regional osseous structures are unchanged.  Impression: Interval increase in bilateral infiltrates.    Electronically signed by: Smair Samano MD  Date:    11/05/2020  Time:    23:01      All imaging within the last 24 hours was reviewed.     Assessment and Plan:    Active Hospital Problems    Diagnosis  POA    *Suspected COVID-19 virus infection [Z20.828]  Yes    Anxiety and depression [F41.9, F32.9]  Yes    Acute respiratory failure with hypoxia [J96.01]  Yes    Fever [R50.9]  Yes    Cough [R05]  Yes    Labile blood pressure [R09.89]  Yes    Family history of diabetes mellitus [Z83.3]  Not Applicable      Resolved Hospital Problems   No resolved problems to display.       COVID-19 Virus Infection:  Viral Pneumonia due to COVID-19:  -Pt tested for COVID-19 and noted to be positive   -Isolation: Airborne/Droplet. Surgical mask on patient. Notify Infection Control  -Management: per [facility] COVID Treatment Protocol (4/15/20)  -Monitoring: Telemetry & Continuous Pulse Oximetry  -Antibiotics: started on ceftriaxone 1g Q24h x 5 days and azithromycin 500mg po x1, then 250mg po daily x 4 days  -Nutrition:    -Multivitamin PO daily   -Add Boost supplement   -Vitamin D 1000IU daily if deficient   -Ascorbic acid 500mg PO bid  -Supportive Care:   -Acetaminophen 650mg PO Q6hr PRN  fever/headache   -Loperamide PRN viral diarrhea   -Robitussin, tessalon perls for cough   -IVF if indicated, restrictive strategy preferred, no maintenance IV if able   -Investigational Therapies:  -Atorvastatin 40mg po daily   -Due to hypoxia, pt started on dexamethasone 6mg PO daily up to 10 days or until pt is discharged from hospital (whichever is sooner)   -Pt meets criteria for remdesivir. Pt provided verbal consent to start this medication; currently on D#4   --Pt meets criteria for convalescent plasma. Pt provided verbal consent to start this medication; ordered on 11/8    Acute Hypoxemic Respiratory Failure:  -currently requiring 3L by nasal cannula; wean as tolerated  -RT consult via Respiratory Communication for COVID Protocols  -If wheezing, albuterol INH Q6h scheduled & PRN  -Incentive Spirometer Q4h  -Flutter Valve Q4h  -Continuous pulse oximetry; titrate oxygen to keep sats between 92-96%  -Wean off O2 as tolerated    -Supplemental O2 via LFNC, VentiMask, or HFNC (see Respiratory Support Oxygen Therapies)  -Proning Protocol if patient is a candidate with GCS >13 and able to self-prone (see HM Proning Protocol)  -If deterioration, may warrant trial of NIPPV and transfer to neg pressure room or immediate ICU consult    Anxiety/depression:  -patient's mother recently passed away due to COVID.  His father was also sick with COVID and intubated in the ICU for long time, but is doing better now.  -patient is a lot of anxiety/and admitted to having panic attacks related to COVID  -Vistaril 25 mg q.8 hours p.r.n. available for now  -will need outpatient PCP/psych follow-up for chronic management of anxiety/depression    Elevated LFT's  -have been variable since last admit  -suspect may be due to remdesivir currently  -proceed with acute hep panel    Goals of care, counseling/discussion  -Reviewed the typical clinical course of COVID19 with pt, including the potential for acute decompensation requiring  intubation and mechanical ventilation  -Discussed again as part of routine daily evaluation, patient/POA maintains code status of full    VTE High Risk Prophylaxis: enoxaparin 40mg sq QHS @ 2100 (bundled care) if GFR >30    Discharge Planning   AFIZA: 11/10/2020     Code Status: Full Code   Is the patient medically ready for discharge?:     Reason for patient still in hospital (select all that apply): Patient trending condition  Discharge Plan A: Home   Discharge Delays: None known at this time        Linda Rain MD  Hospital Medicine [facility] [phone]

## 2020-11-10 NOTE — DISCHARGE INSTRUCTIONS
[place] [facility] and [facility]  Preventing the Spread of Coronavirus Disease 2019 in Homes and Residential Communities      Prevention steps for people with confirmed or suspected COVID-19 (including persons under investigation) who do not need to be hospitalized and people with confirmed COVID-19 who were hospitalized and determined to be medically stable to go home.    Your healthcare provider and public health staff will evaluate whether you can be cared for at home.   Stay home except to get medical care.   Separate yourself from other people and animals in your home   Call ahead before visiting your doctor.   Wear a facemask.   Cover your coughs and sneezes.   Clean your hands often.   Avoid sharing personal household items.   Clean all high-touch surfaces every day.   Monitor your symptoms. Seek prompt medical attention if your illness is worsening (e.g., difficulty breathing). Before seeking care, call your healthcare provider.   If you have a medical emergency and need to call 911, notify the dispatch personnel that you   have, or are being evaluated for COVID-19. If possible, put on a facemask before emergency   medical services arrive.   Discontinuing home isolation. You may discontinue home isolation on 11/21/2020.    Recommended precautions for household members, intimate partners, and caregivers in a nonhealthcare setting of a patient with symptomatic laboratory-confirmed COVID-19 or a patient under investigation.  Household members, intimate partners, and caregivers in a nonhealthcare setting may have close  contact with a person with symptomatic, laboratory-confirmed COVID-19 or a person under  investigation. Close contacts should monitor their health; they should call their healthcare provider right  away if they develop symptoms suggestive of COVID-19 (e.g., fever, cough, shortness of breath).    Close contacts should also follow these recommendations:   Make sure that  you understand and can help the patient follow their healthcare provider's instructions for medication(s) and care. You should help the patient with basic needs in the home and provide support for getting groceries, prescriptions, and other personal needs.   Monitor the patient's symptoms. If the patient is getting sicker, call his or her healthcare provider and tell them that the patient has laboratory-confirmed COVID-19. This will help the healthcare provider's office take steps to keep other people in the office or waiting room from getting infected. Ask the healthcare provider to call the local or [facility] health department for additional guidance. If the patient has a medical emergency and you need to call 911, notify the dispatch personnel that the patient has, or is being evaluated for COVID-19.   Household members should stay in another room or be  from the patient as much as possible. Household members should use a separate bedroom and bathroom, if available.   Prohibit visitors who do not have an essential need to be in the home.   Household members should care for any pets in the home. Do not handle pets or other animals while sick.   Make sure that shared spaces in the home have good air flow, such as by an air conditioner or an opened window, weather permitting.   Perform hand hygiene frequently. Wash your hands often with soap and water for at least 20 seconds or use an alcohol-based hand  that contains 60 to 95% alcohol, covering all surfaces of your hands and rubbing them together until they feel dry. Soap and water should be used preferentially if hands are visibly dirty.   Avoid touching your eyes, nose, and mouth with unwashed hands.   The patient should wear a facemask when you are around other people. If the patient is not able to wear a facemask (for example, because it causes trouble breathing), you, as the caregiver should wear a mask when you are in the same room as  the patient.   Wear a disposable facemask and gloves when you touch or have contact with the patient's blood, stool, or body fluids, such as saliva, sputum, nasal mucus, vomit, urine.  o Throw out disposable facemasks and gloves after using them. Do not reuse.  o When removing personal protective equipment, first remove and dispose of gloves. Then, immediately clean your hands with soap and water or alcohol-based hand . Next, remove and dispose of facemask, and immediately clean your hands again with soap and water or alcohol-based hand .   Avoid sharing household items with the patient. You should not share dishes, drinking glasses, cups, eating utensils, towels, bedding, or other items. After the patient uses these items, you should wash them thoroughly (see below Wash laundry thoroughly).   Clean all high-touch surfaces, such as counters, tabletops, doorknobs, bathroom fixtures, toilets, phones, keyboards, tablets, and bedside tables, every day. Also, clean any surfaces that may have blood, stool, or body fluids on them.   Use a household cleaning spray or wipe, according to the label instructions. Labels contain instructions for safe and effective use of the cleaning product including precautions you should take when applying the product, such as wearing gloves and making sure you have good ventilation during use of the product.   Wash laundry thoroughly.  o Immediately remove and wash clothes or bedding that have blood, stool, or body fluids on them.  o Wear disposable gloves while handling soiled items and keep soiled items away from your body. Clean your hands (with soap and water or an alcohol-based hand ) immediately after removing your gloves.  o Read and follow directions on labels of laundry or clothing items and detergent. In general, using a normal laundry detergent according to washing machine instructions and dry thoroughly using the warmest temperatures  recommended on the clothing label.   Place all used disposable gloves, facemasks, and other contaminated items in a lined container before disposing of them with other household waste. Clean your hands (with soap and water or an alcohol-based hand ) immediately after handling these items. Soap and water should be used preferentially if hands are visibly dirty.   Discuss any additional questions with your Critical access hospital or local health department or healthcare provider. Check available hours when contacting your local health department.      -------------------------------------------------------------------------------------------------------------------------------------------        Instructions for Home Care of Patients and Caretakers with Coronavirus Disease 2019     Patients will be given one mask to take home with them if having symptoms of fever, cough, shortness of breath, and within 6 feet of others.   Limit visitors to the home.  Older persons and those that have chronic medical conditions such as diabetes, lung and heart disease are at increased risk for illness.    If possible, patients should use a separate bedroom while recovering. Caregivers and household members should avoid prolonged contact with the patient which means to stay 6 feet away and avoid contact with cough droplets.  When close contact is necessary, wash your hands before and immediately after contact.    Perform hand hygiene frequently. Wash your hands often with soap and water for at least 20 seconds or use an alcohol-based hand , covering all surfaces of your hands and rubbing them together until they feel dry.    Avoid touching your eyes, nose, and mouth with unwashed hands.   Avoid sharing household items with the patient. You should not share dishes, drinking glasses, cups, eating utensils, towels, bedding, or other items. After the patient uses these items, you should wash them thoroughly.   Wash laundry  thoroughly.   o Immediately remove and wash clothes or bedding that have blood, stool, or body fluids on them.   Clean all high-touch surfaces, such as counters, tabletops, doorknobs, bathroom fixtures, toilets, phones, keyboards, tablets, and bedside tables, every day.   o Use a household cleaning spray or wipe, according to the label instructions. Labels contain instructions for safe and effective use of the cleaning product including precautions you should take when applying the product, such as wearing gloves and making sure you have good ventilation during use of the product.    For more information see CDC link below.      https://www.cdc.gov/coronavirus/2019-ncov/hcp/guidance-prevent-spread.html#precautions

## 2020-11-10 NOTE — PLAN OF CARE
11/10/2020      Robbie Connell  [address] [place] [address]          Hospital Medicine Dept.  [facility] [address] [place] [id number] [phone] after hours  [phone] fax Robbie Connell has been hospitalized at the [facility] since 11/5/2020.  He may discontinue home isolation on 11/21/2020 (based on CDC guidelines for COVID-19 isolation.)  Please excuse the patient from duties.     Please contact me if you have any questions.                  ________e-sig______________  Linda Rain MD  11/10/2020

## 2020-11-11 ENCOUNTER — NURSE TRIAGE (OUTPATIENT)
Dept: ADMINISTRATIVE | Facility: CLINIC | Age: 30
End: 2020-11-11

## 2020-11-11 NOTE — TELEPHONE ENCOUNTER
1st attempt to contact pt for enrollment in Covid Surveillance program. No answer x2. Left voicemail and sent [employer] message. Will attempt outreach again later.    Reason for Disposition   Message left on unidentified voice mail. Phone number verified.    Protocols used: NO CONTACT OR DUPLICATE CONTACT CALL-A-[place]

## 2020-11-11 NOTE — TELEPHONE ENCOUNTER
2nd attempt to contact pt for enrollment in Covid Surveillance program. No answer x2. Left voicemail and MyChart message pending from this morning. Will attempt outreach again tomorrow.    Reason for Disposition   Message left on unidentified voice mail. Phone number verified.    Protocols used: NO CONTACT OR DUPLICATE CONTACT CALL-A-[place]

## 2020-11-11 NOTE — PLAN OF CARE
Patient medically ready for discharge to home with home oxygen.  Any necessary transport setup by .  This CM scheduled necessary follow-up appointments.  Family/ patient aware of discharge.    Future Appointments   Date Time Provider Department Center   11/16/2020 10:00 AM Hector Frazier MD [facility] Halina Tate RN CM [facility]  Department of Case Management  [address] [place] [id number]       11/11/20 0919   Final Note   Assessment Type Final Discharge Note   Anticipated Discharge Disposition Home  (with home oxygen)   What phone number can be called within the next 1-3 days to see how you are doing after discharge? [phone]   Hospital Follow Up  Appt(s) scheduled? Yes  (Dr. Frazier 11/16/20 @ 1000AM)   Discharge plans and expectations educations in teach back method with documentation complete? Yes  (per bedside nurse)   Right Care Referral Info   Post Acute Recommendation No Care   Post-Acute Status   Other Status No Post-Acute Service Needs  (home 02)   Discharge Delays None known at this time

## 2020-11-12 ENCOUNTER — NURSE TRIAGE (OUTPATIENT)
Dept: ADMINISTRATIVE | Facility: CLINIC | Age: 30
End: 2020-11-12

## 2020-11-12 NOTE — TELEPHONE ENCOUNTER
3rd attempt to contact pt for enrollment in Covid Surveillance program. No answer x2. Left voicemail. Due to 3 unsuccessful attempts to contact pt for enrollment (was never able to contact pt for enrollment last time he was enrolled after his last hospitalization), per surveillance policy, will un-enroll pt from surveillance. DID NOT remove tasks so pt still eligible to participate by logging into [employer] and submitting consent. Placed order to enroll pt in text home symptom monitoring. Sent [employer] message to pt. No further attempts to contact pt for enrollment will be made.     Reason for Disposition   Message left on unidentified voice mail. Phone number verified.    Protocols used: NO CONTACT OR DUPLICATE CONTACT CALL-A-[place]

## 2020-11-16 NOTE — DISCHARGE SUMMARY
[facility] - ICU [facility] Medicine  Discharge Summary      Patient Name: Robbie Connell  MRN: [id number]  Admission Date: 11/5/2020  Hospital Length of Stay: 5 days  Discharge Date and Time: 11/10/2020  6:21 PM  Attending Physician: No att. providers found   Discharging Provider: Linda Rain MD  Primary Care Provider: Primary Doctor Shelly        HPI:   Patient is a 29 yo male with no significant past medical history coming in after feeling worse for the past couple of days and noticing his home o2 monitor was reading 89%. He was recently admitted and discharged from this facility for COVID symptoms. He states he was not on oxygen during that hospital stay but was sent home with the monitor and noted that his oxygen went down so he came to the hospital. He states that the oxygen placed in the ED, made him fell better. He was noted to be hypoxic to 85 in the ED, was placed on oxygen and medicine called for admission. He states compliance with azithromycin since leaving.        * No surgery found *      Hospital Course:     COVID-19 Virus Infection:  Viral Pneumonia due to COVID-19:  Acute Hypoxemic Respiratory Failure:  -Pt tested for COVID-19 and noted to be positive on 11/2/2020  - completed remdesivir therapy; treated with decadron  - patient also received 1 unit of convalescent plasma  - oxygen able to be weaned but required 2L/min with activity on discharge    Anxiety/depression:  -patient's mother recently passed away due to COVID.  His father was also sick with COVID and intubated in the ICU for long time, but is doing better now.  -patient is a lot of anxiety/and admitted to having panic attacks related to COVID  -Vistaril 25 mg q.8 hours p.r.n. available for now  -will need outpatient PCP/psych follow-up for chronic management of anxiety/depression     Elevated LFT's  -have been variable since last admit  -suspect may be due to remdesivir currently  -proceed with acute hep  "panel      Consults:   Consults (From admission, onward)        Status Ordering Provider     Inpatient virtual consult to Hospital Medicine  Once     Provider:  (Not yet assigned)    Completed DAKOTA FITZGERALD          Final Active Diagnoses:    Diagnosis Date Noted POA    PRINCIPAL PROBLEM:  Suspected COVID-19 virus infection [Z20.828] 11/05/2020 Yes    Anxiety and depression [F41.9, F32.9]  Yes    Acute respiratory failure with hypoxia [J96.01] 11/06/2020 Yes    Fever [R50.9] 04/04/2016 Yes    Cough [R05] 04/04/2016 Yes    Labile blood pressure [R09.89] 02/14/2015 Yes    Family history of diabetes mellitus [Z83.3] 02/14/2015 Not Applicable      Problems Resolved During this Admission:      Discharged Condition: stable    Disposition: Home or Self Care    Follow Up:  Follow-up Information     Hector Frazeir MD. Go on 11/16/2020.    Specialty: Internal Medicine  Why: at 10 am  Contact information:  [address] [facility]  [place] [id number] [phone]                 Patient Instructions:      NEBULIZER FOR HOME USE   Order Comments: Failed inhaler use     Order Specific Question Answer Comments   Height: 5' 6" (1.676 m)    Weight: 122.4 kg (269 lb 13.5 oz)    Does patient have medical equipment at home? none    Length of need (1-99 months): 12    Vendor: [facility]    Expected Date of Delivery: 11/10/2020      NEBULIZER KIT (SUPPLIES) FOR HOME USE     Order Specific Question Answer Comments   Height: 5' 6" (1.676 m)    Weight: 122.4 kg (269 lb 13.5 oz)    Does patient have medical equipment at home? none    Length of need (1-99 months): 12    Mask or Mouthpiece? Mouthpiece    Vendor: [facility]    Expected Date of Delivery: 11/10/2020      OXYGEN FOR HOME USE     Order Specific Question Answer Comments   Liter Flow 2    Duration With activity    Qualifying SpO2: 87    Testing done at: Exercise/Activity    Route nasal cannula    Device home concentrator with portable unit    Length of need (in months): 12 mos  " "  Patient condition with qualifying saturation  COVID-19   Height: 5' 6" (1.676 m)    Weight: 122.4 kg (269 lb 13.5 oz)    Does patient have medical equipment at home? none    Alternative treatment measures have been tried or considered and deemed clinically ineffective. Yes    Vendor: [facility]    Expected Date of Delivery: 11/10/2020      Ambulatory referral/consult to Pulmonology   Standing Status: Future   Referral Priority: Routine Referral Type: Consultation   Referral Reason: Specialty Services Required   Requested Specialty: Pulmonary Disease   Number of Visits Requested: 1     Diet Adult Regular     Notify your health care provider if you experience any of the following:  temperature >100.4     Notify your health care provider if you experience any of the following:  persistent nausea and vomiting or diarrhea     Notify your health care provider if you experience any of the following:  severe uncontrolled pain     Notify your health care provider if you experience any of the following:  difficulty breathing or increased cough     Notify your health care provider if you experience any of the following:  severe persistent headache     Notify your health care provider if you experience any of the following:  worsening rash     Notify your health care provider if you experience any of the following:  persistent dizziness, light-headedness, or visual disturbances     Notify your health care provider if you experience any of the following:  increased confusion or weakness     COVID-19 Surveillance Program     Order Specific Question Answer Comments   Does patient have a smartphone? Yes    Does patient have the [facility] on their smartphone? Yes    While in surveillance program, will patient be using home oxygen? No      COVID-19 Surveillance Program     Order Specific Question Answer Comments   Does patient have a smartphone? Yes    Does patient have the [facility] on their smartphone? Yes    While in " surveillance program, will patient be using home oxygen? Yes      Activity as tolerated     Medications:  Reconciled Home Medications:      Medication List      START taking these medications    benzonatate 100 MG capsule  Commonly known as: TESSALON  Take 1 capsule (100 mg total) by mouth 3 (three) times daily. for 10 days     dextromethorphan-guaifenesin  mg/5 ml  mg/5 mL liquid  Commonly known as: ROBITUSSIN-DM  Take 10 mLs by mouth every 4 (four) hours as needed.     hydrOXYzine pamoate 25 MG Cap  Commonly known as: VISTARIL  Take 1 capsule (25 mg total) by mouth every 8 (eight) hours as needed (anxiety).        CHANGE how you take these medications    * albuterol 90 mcg/actuation inhaler  Commonly known as: PROVENTIL/VENTOLIN HFA  Inhale 2 puffs into the lungs every 6 (six) hours as needed for Wheezing or Shortness of Breath. Rescue  What changed: Another medication with the same name was added. Make sure you understand how and when to take each.     * albuterol sulfate 2.5 mg/0.5 mL Nebu  Inhale one vial (2.5 mg) by nebulization 3 (three) times daily. And every 4 hours as needed for wheezing / shortness of breath. Rescue  What changed: You were already taking a medication with the same name, and this prescription was added. Make sure you understand how and when to take each.         * This list has 2 medication(s) that are the same as other medications prescribed for you. Read the directions carefully, and ask your doctor or other care provider to review them with you.            CONTINUE taking these medications    acetaminophen 500 MG tablet  Commonly known as: TYLENOL  Take 1 tablet (500 mg total) by mouth every 6 (six) hours as needed (pain or temp > 101). OTC     ascorbic acid (vitamin C) 500 MG tablet  Commonly known as: VITAMIN C  Take 1 tablet (500 mg total) by mouth 2 (two) times daily.     multivitamin Tab  Take 1 tablet by mouth once daily. Over the counter     pulse oximeter  device  Commonly known as: pulse oximeter  by Apply Externally route 2 (two) times a day. Use twice daily at 8 AM and 3 PM and record the value in MyChart as directed.     vitamin D 1000 units Tab  Commonly known as: VITAMIN D3  Take 1 tablet (1,000 Units total) by mouth once daily. Over the counter        STOP taking these medications    azithromycin 250 MG tablet  Commonly known as: Z-JUAN CARLOS            Significant Diagnostic Studies: as above    Pending Diagnostic Studies:     None        Indwelling Lines/Drains at time of discharge:   Lines/Drains/Airways     None                 Time spent on the discharge of patient: 35 minutes  Patient was seen and examined on the date of discharge and determined to be suitable for discharge.         Linda Rain MD  Department of Hospital Medicine  [facility] - ICU [facility]

## 2021-04-15 ENCOUNTER — PATIENT MESSAGE (OUTPATIENT)
Dept: RESEARCH | Facility: HOSPITAL | Age: 31
End: 2021-04-15

## 2022-01-03 ENCOUNTER — OFFICE VISIT (OUTPATIENT)
Dept: UROLOGY | Facility: CLINIC | Age: 32
End: 2022-01-03
Payer: COMMERCIAL

## 2022-01-03 VITALS
HEART RATE: 88 BPM | HEIGHT: 66 IN | SYSTOLIC BLOOD PRESSURE: 151 MMHG | BODY MASS INDEX: 46.45 KG/M2 | DIASTOLIC BLOOD PRESSURE: 92 MMHG | WEIGHT: 289 LBS

## 2022-01-03 DIAGNOSIS — Z30.2 ENCOUNTER FOR MALE STERILIZATION PROCEDURE: Primary | ICD-10-CM

## 2022-01-03 PROBLEM — R55 SYNCOPE: Status: RESOLVED | Noted: 2020-11-03 | Resolved: 2022-01-03

## 2022-01-03 PROBLEM — Z20.822 SUSPECTED COVID-19 VIRUS INFECTION: Status: RESOLVED | Noted: 2020-11-05 | Resolved: 2022-01-03

## 2022-01-03 PROBLEM — J96.01 ACUTE RESPIRATORY FAILURE WITH HYPOXIA: Status: RESOLVED | Noted: 2020-11-06 | Resolved: 2022-01-03

## 2022-01-03 PROBLEM — U07.1 COVID-19: Status: RESOLVED | Noted: 2020-11-02 | Resolved: 2022-01-03

## 2022-01-03 PROCEDURE — 3080F PR MOST RECENT DIASTOLIC BLOOD PRESSURE >= 90 MM HG: ICD-10-PCS | Mod: CPTII,S$GLB,, | Performed by: UROLOGY

## 2022-01-03 PROCEDURE — 3077F SYST BP >= 140 MM HG: CPT | Mod: CPTII,S$GLB,, | Performed by: UROLOGY

## 2022-01-03 PROCEDURE — 1160F RVW MEDS BY RX/DR IN RCRD: CPT | Mod: CPTII,S$GLB,, | Performed by: UROLOGY

## 2022-01-03 PROCEDURE — 99999 PR PBB SHADOW E&M-EST. PATIENT-LVL III: CPT | Mod: PBBFAC,,, | Performed by: UROLOGY

## 2022-01-03 PROCEDURE — 3008F PR BODY MASS INDEX (BMI) DOCUMENTED: ICD-10-PCS | Mod: CPTII,S$GLB,, | Performed by: UROLOGY

## 2022-01-03 PROCEDURE — 99999 PR PBB SHADOW E&M-EST. PATIENT-LVL III: ICD-10-PCS | Mod: PBBFAC,,, | Performed by: UROLOGY

## 2022-01-03 PROCEDURE — 1159F PR MEDICATION LIST DOCUMENTED IN MEDICAL RECORD: ICD-10-PCS | Mod: CPTII,S$GLB,, | Performed by: UROLOGY

## 2022-01-03 PROCEDURE — 99204 OFFICE O/P NEW MOD 45 MIN: CPT | Mod: S$GLB,,, | Performed by: UROLOGY

## 2022-01-03 PROCEDURE — 3080F DIAST BP >= 90 MM HG: CPT | Mod: CPTII,S$GLB,, | Performed by: UROLOGY

## 2022-01-03 PROCEDURE — 3077F PR MOST RECENT SYSTOLIC BLOOD PRESSURE >= 140 MM HG: ICD-10-PCS | Mod: CPTII,S$GLB,, | Performed by: UROLOGY

## 2022-01-03 PROCEDURE — 3008F BODY MASS INDEX DOCD: CPT | Mod: CPTII,S$GLB,, | Performed by: UROLOGY

## 2022-01-03 PROCEDURE — 1159F MED LIST DOCD IN RCRD: CPT | Mod: CPTII,S$GLB,, | Performed by: UROLOGY

## 2022-01-03 PROCEDURE — 99204 PR OFFICE/OUTPT VISIT, NEW, LEVL IV, 45-59 MIN: ICD-10-PCS | Mod: S$GLB,,, | Performed by: UROLOGY

## 2022-01-03 PROCEDURE — 1160F PR REVIEW ALL MEDS BY PRESCRIBER/CLIN PHARMACIST DOCUMENTED: ICD-10-PCS | Mod: CPTII,S$GLB,, | Performed by: UROLOGY

## 2022-01-03 RX ORDER — LIDOCAINE HYDROCHLORIDE 10 MG/ML
20 INJECTION INFILTRATION; PERINEURAL ONCE
Status: COMPLETED | OUTPATIENT
Start: 2022-02-02 | End: 2022-02-17

## 2022-01-03 NOTE — PROGRESS NOTES
Chief Complaint:   Undesired fertility    HPI:  Mr. Connell is a 31 y.o.  male who presents for evaluation re vasectomy. He has 5 children, ages 11, 7, 5, 2, and < 2 yo, and he is certain that he desires no more. He is aware of other forms of contraception.  He's currently using nothing for contraception. He is aware that vasectomy is to be considered permanent/irreversible.    He denies orchalgia.  No dysuria.  No hematuria.    ROS:  Robbie Connell denies headache, blurred vision, fever, nausea, vomiting, chills, abdominal pain, chest pain, sore throat, bleeding per rectum, cough, SOB, recent loss of consciousness, recent mental status changes, seizures, dizziness, or upper or lower extremity weakness.    Past Medical History    Past Medical History:   Diagnosis Date    Family history of diabetes mellitus 2/14/2015       Past Surgical History    Past Surgical History:   Procedure Laterality Date    SHOULDER SURGERY         Social History    Social History     Socioeconomic History    Marital status:    Tobacco Use    Smoking status: Never Smoker    Smokeless tobacco: Never Used   Substance and Sexual Activity    Alcohol use: Not Currently    Drug use: No       Family History  Family History   Problem Relation Age of Onset    Diabetes Mother     Diabetes Father     Cancer Maternal Grandmother     Stroke Neg Hx     Heart disease Neg Hx     Hyperlipidemia Neg Hx     Hypertension Neg Hx          Medications    Current Outpatient Medications:     acetaminophen (TYLENOL) 500 MG tablet, Take 1 tablet (500 mg total) by mouth every 6 (six) hours as needed (pain or temp > 101). OTC, Disp: , Rfl: 0    albuterol sulfate 2.5 mg/0.5 mL Nebu, Inhale one vial (2.5 mg) by nebulization 3 (three) times daily. And every 4 hours as needed for wheezing / shortness of breath. Rescue, Disp: 90 each, Rfl: 11    ascorbic acid, vitamin C, (VITAMIN C) 500 MG tablet, Take 1 tablet (500 mg total) by mouth 2 (two)  times daily. (Patient not taking: Reported on 1/3/2022), Disp:  , Rfl:     hydrOXYzine pamoate (VISTARIL) 25 MG Cap, Take 1 capsule (25 mg total) by mouth every 8 (eight) hours as needed (anxiety). (Patient not taking: Reported on 1/3/2022), Disp: 30 capsule, Rfl: 0    multivitamin Tab, Take 1 tablet by mouth once daily. Over the counter (Patient not taking: Reported on 1/3/2022), Disp:  , Rfl:     pulse oximeter (PULSE OXIMETER) device, by Apply Externally route 2 (two) times a day. Use twice daily at 8 AM and 3 PM and record the value in [employer] as directed. (Patient not taking: Reported on 1/3/2022), Disp: 1 each, Rfl: 0    vitamin D (VITAMIN D3) 1000 units Tab, Take 1 tablet (1,000 Units total) by mouth once daily. Over the counter (Patient not taking: Reported on 1/3/2022), Disp:  , Rfl:     Allergies  Review of patient's allergies indicates:  No Known Allergies      ?  PHYSICAL EXAM:    The patient generally appears in good health, is appropriately interactive, and is in no apparent distress.     Eyes: anicteric sclerae, moist conjunctivae; no lid-lag; PERRLA     HENT: Atraumatic; oropharynx clear with moist mucous membranes and no mucosal ulcerations;normal hard and soft palate.  No evidence of lymphadenopathy.    Neck: Trachea midline.  No thyromegaly.    Skin: No lesions.    Mental: Cooperative with normal affect.  Is oriented to time, place, and person.    Neuro: Grossly intact.    Chest: Normal inspiratory effort.   No accessory muscles.  No audible wheezes.  Respirations symmetric on inspiration and expiration.    Heart: Regular rhythm.      Abdomen:  Soft, non-tender. No masses or organomegaly. Bladder is not palpable. No evidence of flank discomfort. No evidence of inguinal hernia.    Genitourinary: The penis has no evidence of plaques or induration. The urethral meatus is normal. The testes, epididymides, and cord structures are normal in size and contour bilaterally. The scrotum is normal in size  and contour.    Extremities: No clubbing, cyanosis, or edema          A/P:  Robbie Connell is a 31 y.o. male who presents for evaluation re vasectomy.    1.I discussed with the patient risks and benefits, as well as alternatives. We discussed possible complications at length, including fever, infection, bleeding--possibly requiring reoperation,testicular injury or atrophy with loss of function, chronic pain, persistent or recurrent presence of sperm in the ejaculate, vasal recanalization, as well as the risks attendant to the anesthetic.  2.We discussed that he should stop aspirin, ibuprofen, and similar products at least one week prior to the procedure. Acetominophen is okay to use for headaches, pain, etc.  3. He should bring an athletic supporter and loose fitting sweat pants on the day of the procedure.  4. We discussed that he must continue to use contraception until two consecutive semen analyses (checked at approximately 4-6 weeks post-vasectomy) reveal azoospermia.  5. He will schedule an elective vasectomy.

## 2022-01-03 NOTE — PATIENT INSTRUCTIONS
Having a Vasectomy: Before, During, and After the Procedure  Vasectomy is an outpatient (same day) procedure. It can be done in a doctors office, clinic, or hospital. Your doctor will talk with you about preparing for surgery. He or she will also discuss the possible risks and complications with you. After the procedure, follow all your doctors advice for recovery.  Preparing for Surgery      The cut ends of the vas may be tied, closed with a clip, or sealed by heat (cauterized).    Your doctor will talk with you about getting ready for surgery. You may be asked to do the following:  · Sign a consent form. This must be done at least a few days before surgery. It gives your doctor permission to do the procedure. It also states that a vasectomy is not guaranteed to make you sterile.  · Dont take aspirin, ibuprofen, or naproxen for 1 week before surgery or 1 week after. These medications can cause bleeding after the procedure. Also, tell your doctor if you take any medications, supplements, or herbal remedies.  · Tell your doctor if youve had any prior scrotal surgery.  · Arrange for an adult family member or friend to give you a ride home after surgery.  · Shower and clean your scrotum the day of surgery. Your doctor may also ask you to shave your scrotum.  · Bring an athletic supporter (jockstrap) and a pair of loose fitting pants to the doctors office or hospital.  · Eat something with sugar before surgery.  During Surgery  The entire procedure usually lasts less than 30 minutes.  · Youll be asked to undress and lie on a table.  · To prevent pain during surgery, youll be given an injection of local anesthetic in your scrotum or lower groin.  · Once the area is numb, one or two small incisions are made in the scrotum.   · The vas deferens are lifted through the incision and cut. The ends of the vas are then sealed off using one of several methods.  · If needed, the incision is closed with stitches.  · You can  rest for a while until youre ready to go home.  Recovering at Home  For about a week, your scrotum may look bruised and slightly swollen. You may also have a small amount of bloody discharge from the incision. This is normal.  To help make your recovery more comfortable, follow the tips below.  · Stay off your feet as much as possible for the first 2 days.   · Wear an athletic supporter or snug cotton briefs for support.  · Ice the area for 24 hours  · Take medications with acetaminophen (such as Tylenol) to relieve any discomfort. Dont use aspirin, ibuprofen, or naproxen.  · Avoid heavy lifting, exercise, or intercourse for 7 days.  · Remember: You must use another form of birth control until youre completely sterile.  Call your doctor if you notice any of the following after surgery:   · Increasing pain or swelling in your scrotum  · A large black-and-blue area, or a growing lump  · Fever or chills  · Increasing redness or drainage of the incision  · Trouble urinating    Sex After Vasectomy  Vasectomy doesnt change your sexual function. So when you start having sex again, it should feel the same as before. A vasectomy also shouldnt affect your relationship with your partner. Its important to remember, though, that you wont become sterile right away. It will take time before you can have sex without the need for birth control.  · Until youre sterile: After a vasectomy, some active sperm still remain in your semen. It will take time and many ejaculations before the sperm are completely gone. During this period, you must use another birth control method to prevent pregnancy. To make sure no sperm are left in your semen, youll need to have one or more semen exams. You usually collect a semen sample at home and bring it to a lab. The sample is then checked under a microscope. Youre sterile only when these samples show no evidence of sperm. Ask your doctor whether additional follow-up is needed.  · After  youre sterile: After your doctor tells you youre sterile, you no longer need to use any form of birth control. Youre free to have sex without the fear of unwanted pregnancy. However, a vasectomy does not protect you from sexually transmitted diseases (STDs). If you have more than one sex partner, be sure to practice safer sex by using condoms.  © 6294-5633 [employer], [address] [place] [address], [place] [address]. All rights reserved. This information is not intended as a substitute for professional medical care. Always follow your healthcare professional's instructions.    Vasectomy: Risks and Complications  A vasectomy is an outpatient procedure. This means youll go home the same day. Its done in a doctors office, clinic, or hospital. Before your procedure, youll be asked to read and sign a consent form. This form states youre aware of the possible risks and complications. It also says that you understand that the procedure, though most often successful, cant promise to make you sterile. Be sure you have all your questions answered before you sign this form. Below is a list of risks and possible complications of the procedure.  Risks and Possible Complications of Vasectomy   Vasectomy is safe. But it does have risks. They include the following:  · Bleeding or infection.  · Sperm granuloma. This is a small, harmless lump. It may form where the vas deferens is sealed off.  · Loss of Testicle  · Epididymitis.This is inflammation that may cause scrotal aching. It often goes away without treatment. Anti-inflammatory medications can provide relief.  · Reconnection of the vas deferens. This can occur in rare cases. It makes you fertile again. This can result in an unwanted pregnancy.  · Sperm antibodies.These are a common response of the body to absorbed sperm. The antibodies can make you sterile. This is true even if you later try to reverse your vasectomy.  · Long-term testicular discomfort.This may occur  after surgery. But its very rare.    © 9300-5337 [employer], [address] [place] [address], [place] [address]. All rights reserved. This information is not intended as a substitute for professional medical care. Always follow your healthcare professional's instructions.

## 2022-02-17 ENCOUNTER — PROCEDURE VISIT (OUTPATIENT)
Dept: UROLOGY | Facility: CLINIC | Age: 32
End: 2022-02-17
Payer: COMMERCIAL

## 2022-02-17 VITALS
HEART RATE: 78 BPM | SYSTOLIC BLOOD PRESSURE: 164 MMHG | BODY MASS INDEX: 41.54 KG/M2 | HEIGHT: 70 IN | TEMPERATURE: 61 F | DIASTOLIC BLOOD PRESSURE: 94 MMHG | RESPIRATION RATE: 16 BRPM | WEIGHT: 290.19 LBS

## 2022-02-17 DIAGNOSIS — Z30.2 ENCOUNTER FOR MALE STERILIZATION PROCEDURE: ICD-10-CM

## 2022-02-17 PROCEDURE — 55250 PR REMOVAL OF SPERM DUCT(S): ICD-10-PCS | Mod: S$GLB,,, | Performed by: UROLOGY

## 2022-02-17 PROCEDURE — 55250 REMOVAL OF SPERM DUCT(S): CPT | Mod: S$GLB,,, | Performed by: UROLOGY

## 2022-02-17 RX ORDER — OXYCODONE AND ACETAMINOPHEN 5; 325 MG/1; MG/1
1 TABLET ORAL EVERY 4 HOURS PRN
Qty: 8 TABLET | Refills: 0 | Status: SHIPPED | OUTPATIENT
Start: 2022-02-17 | End: 2022-02-27

## 2022-02-17 RX ORDER — CEPHALEXIN 500 MG/1
500 CAPSULE ORAL 4 TIMES DAILY
Qty: 8 CAPSULE | Refills: 0 | Status: SHIPPED | OUTPATIENT
Start: 2022-02-17 | End: 2022-02-19

## 2022-02-17 RX ADMIN — LIDOCAINE HYDROCHLORIDE 20 ML: 10 INJECTION INFILTRATION; PERINEURAL at 02:02

## 2022-02-17 NOTE — PATIENT INSTRUCTIONS
What to Expect After a Vasectomy  You cannot drive or operate heavy machinery on the day of the procedure. Begin prescription antibiotics today and take as directed until finished. Dr. Sanchez will give you a prescription for a narcotic pain medication. You may choose to take the prescription medication or Tylenol only for minor discomfort. Do not take any NSAIDS (e.g., Motrin, Naprosyn, etc.) or aspirin for at least one week, as these products can thin the blood.    Apply ice packs to the scrotal area for 24-48 hours. Avoid direct contact of the ice pack with the skin. Scrotal supports, jock straps, or fitted underwear help elevate the scrotum and reduce discomfort.    You may shower the next day. Gently apply soapy water to the scrotum to wash. Rinse and dry yourself by blotting the skin, not rubbing.    Avoid strenuous physical exercises or sexual relations for at least one week after a vasectomy.    Continue to use birth control for at least 6-8 weeks or 20 ejaculations. You are still considered fertile until your urologist examines a post-vasectomy semen analysis after 20 ejaculations and a second one a few days after the first. Drop off the specimens at the 4th floor [facility] department between 8am and 4pm.    Do NOT resume unprotected sexual activity until your physician finds no sperm in your semen.    All stitches will dissolve on their own in 1-2 weeks.    Signs and Symptoms to Report  · A large amount of bleeding at the site.  · An unusual amount of pain.  · A large amount of swelling in the scrotum.  · Fever and chills.  · Any signs of infection, such as redness at the site or foul-smelling discharge    Risks  The risks of complication after vasectomy are very low.    A few of the risks include:  · Bleeding.  · Infection.  · Scrotal hematoma - a collection of blood in the scrotum.  · Inflammation of the epididymis - inflammation of a structure next to the testicle that helps in maturation of the  sperm.  · Sperm granuloma - a collection of sperm that leaks out from the vas deferens, forming a small nodule or lump. This does not usually cause any discomfort, but you may feel it in the scrotum.  · Recanalization - the restoration of the lumen or transport tube between the two ends of the vas deferens, possibly causing fertility.    If you have any questions or concerns, please call Dr. Sanchez at [phone] during regular office hours. If there are any problems after hours or on weekends, you may call [phone] and ask for the urology resident on call.

## 2022-02-17 NOTE — PROCEDURES
Procedures Date: 02/17/2022     Pre procedure diagnosis: male sterilization    Post procedure diagnosis:same    Surgeon: Laura    Assistant: None    Procedure performed: Bilateral vasectomy    Blood loss: Min.    Specimen: None    Procedure in detail:  After informed consent was obtained, the patient was placed in the supine position.  The skin was sterilized with a prep.  Attention was then turned to the patient's left hemiscrotum.    The vas was isolated on this side.  Local anesthesia was applied with 1% lidocaine.  The skin overlying the vas was incised sharply.  The vas was then isolated and grasped with a ring forceps.  It was brought through the incision.  The adventia overlying the vas was incised sharply.  The vas was then doubly clamped with a hemostat.  The vas was divided between the two hemostats with a 15 blade scalpel.    The ends of the vas were cauterized and tied with 2-0 silk sutures.  Two sutures were placed on each side. Electrocautery was used to obtain hemostasis.  A fascial interposition was then done with a 3-0 chromic.    The wound was then closed with a 3-0 chromic.    Attention was then turned to the right hemiscrotum and the exact same procedure was done. The vas was isolated on this side.  Local anesthesia was applied with 1% lidocaine.  The skin overlying the vas was incised sharply.  The vas was then isolated and grasped with a ring forceps.  It was brought through the incision.  The adventia overlying the vas was incised sharply.  The vas was then doubly clamped with a hemostat.  The vas was divided between the two hemostats with a 15 blade scalpel.    The ends of the vas were cauterized and tied with 2-0 silk sutures.  Two sutures were placed on each side. Electrocautery was used to obtain hemostasis.  A fascial interposition was then done with a 3-0 chromic.    The wound was then closed with a 3-0 chromic.    He tolerated the procedure well without complication.  He was advised  to continue contraception until he brings in 2 semen samples that show no sperm.  He is also to avoid lifting, strenuous exercise, intercourse, NSAIDS, and ASA for 1 week.  He will be discharged home is stable condition.  He is to follow up prn.

## 2022-06-01 ENCOUNTER — TELEPHONE (OUTPATIENT)
Dept: UROLOGY | Facility: CLINIC | Age: 32
End: 2022-06-01
Payer: COMMERCIAL

## 2022-06-01 NOTE — TELEPHONE ENCOUNTER
Patient was informed that his post vas specimen showed no sperm.  He was instructed to wait at least 1 week before submitting another sample.  He was instructed to have multiple ejaculations in the meantime and continue to use some form of birth control until he is cleared. He voiced understanding.